# Patient Record
Sex: FEMALE | Race: ASIAN | NOT HISPANIC OR LATINO
[De-identification: names, ages, dates, MRNs, and addresses within clinical notes are randomized per-mention and may not be internally consistent; named-entity substitution may affect disease eponyms.]

---

## 2021-10-29 ENCOUNTER — APPOINTMENT (OUTPATIENT)
Dept: UROLOGY | Facility: CLINIC | Age: 86
End: 2021-10-29

## 2022-06-30 ENCOUNTER — INPATIENT (INPATIENT)
Facility: HOSPITAL | Age: 87
LOS: 2 days | Discharge: HOME HEALTH | DRG: 556 | End: 2022-07-03
Attending: INTERNAL MEDICINE | Admitting: INTERNAL MEDICINE
Payer: MEDICARE

## 2022-06-30 VITALS
TEMPERATURE: 98 F | DIASTOLIC BLOOD PRESSURE: 93 MMHG | OXYGEN SATURATION: 97 % | HEIGHT: 61 IN | SYSTOLIC BLOOD PRESSURE: 152 MMHG | WEIGHT: 121.25 LBS | RESPIRATION RATE: 18 BRPM | HEART RATE: 84 BPM

## 2022-06-30 DIAGNOSIS — M25.551 PAIN IN RIGHT HIP: ICD-10-CM

## 2022-06-30 DIAGNOSIS — Z98.890 OTHER SPECIFIED POSTPROCEDURAL STATES: Chronic | ICD-10-CM

## 2022-06-30 LAB
ALBUMIN SERPL ELPH-MCNC: 4.1 G/DL — SIGNIFICANT CHANGE UP (ref 3.3–5)
ALP SERPL-CCNC: 55 U/L — SIGNIFICANT CHANGE UP (ref 40–120)
ALT FLD-CCNC: 11 U/L — SIGNIFICANT CHANGE UP (ref 10–45)
ANION GAP SERPL CALC-SCNC: 11 MMOL/L — SIGNIFICANT CHANGE UP (ref 5–17)
APPEARANCE UR: CLEAR — SIGNIFICANT CHANGE UP
AST SERPL-CCNC: 24 U/L — SIGNIFICANT CHANGE UP (ref 10–40)
BACTERIA # UR AUTO: ABNORMAL
BASOPHILS # BLD AUTO: 0.02 K/UL — SIGNIFICANT CHANGE UP (ref 0–0.2)
BASOPHILS NFR BLD AUTO: 0.4 % — SIGNIFICANT CHANGE UP (ref 0–2)
BILIRUB SERPL-MCNC: 0.4 MG/DL — SIGNIFICANT CHANGE UP (ref 0.2–1.2)
BILIRUB UR-MCNC: NEGATIVE — SIGNIFICANT CHANGE UP
BUN SERPL-MCNC: 22 MG/DL — SIGNIFICANT CHANGE UP (ref 7–23)
CALCIUM SERPL-MCNC: 10.7 MG/DL — HIGH (ref 8.4–10.5)
CHLORIDE SERPL-SCNC: 107 MMOL/L — SIGNIFICANT CHANGE UP (ref 96–108)
CO2 SERPL-SCNC: 22 MMOL/L — SIGNIFICANT CHANGE UP (ref 22–31)
COLOR SPEC: SIGNIFICANT CHANGE UP
CREAT SERPL-MCNC: 0.96 MG/DL — SIGNIFICANT CHANGE UP (ref 0.5–1.3)
DIFF PNL FLD: NEGATIVE — SIGNIFICANT CHANGE UP
EGFR: 57 ML/MIN/1.73M2 — LOW
EOSINOPHIL # BLD AUTO: 0.02 K/UL — SIGNIFICANT CHANGE UP (ref 0–0.5)
EOSINOPHIL NFR BLD AUTO: 0.4 % — SIGNIFICANT CHANGE UP (ref 0–6)
EPI CELLS # UR: 1 /HPF — SIGNIFICANT CHANGE UP
FLUAV AG NPH QL: SIGNIFICANT CHANGE UP
FLUBV AG NPH QL: SIGNIFICANT CHANGE UP
GLUCOSE SERPL-MCNC: 107 MG/DL — HIGH (ref 70–99)
GLUCOSE UR QL: NEGATIVE — SIGNIFICANT CHANGE UP
HCT VFR BLD CALC: 37.9 % — SIGNIFICANT CHANGE UP (ref 34.5–45)
HGB BLD-MCNC: 12.2 G/DL — SIGNIFICANT CHANGE UP (ref 11.5–15.5)
HYALINE CASTS # UR AUTO: 1 /LPF — SIGNIFICANT CHANGE UP (ref 0–2)
IMM GRANULOCYTES NFR BLD AUTO: 0.4 % — SIGNIFICANT CHANGE UP (ref 0–1.5)
KETONES UR-MCNC: NEGATIVE — SIGNIFICANT CHANGE UP
LEUKOCYTE ESTERASE UR-ACNC: NEGATIVE — SIGNIFICANT CHANGE UP
LYMPHOCYTES # BLD AUTO: 1.53 K/UL — SIGNIFICANT CHANGE UP (ref 1–3.3)
LYMPHOCYTES # BLD AUTO: 28.9 % — SIGNIFICANT CHANGE UP (ref 13–44)
MCHC RBC-ENTMCNC: 30.5 PG — SIGNIFICANT CHANGE UP (ref 27–34)
MCHC RBC-ENTMCNC: 32.2 GM/DL — SIGNIFICANT CHANGE UP (ref 32–36)
MCV RBC AUTO: 94.8 FL — SIGNIFICANT CHANGE UP (ref 80–100)
MONOCYTES # BLD AUTO: 0.42 K/UL — SIGNIFICANT CHANGE UP (ref 0–0.9)
MONOCYTES NFR BLD AUTO: 7.9 % — SIGNIFICANT CHANGE UP (ref 2–14)
NEUTROPHILS # BLD AUTO: 3.28 K/UL — SIGNIFICANT CHANGE UP (ref 1.8–7.4)
NEUTROPHILS NFR BLD AUTO: 62 % — SIGNIFICANT CHANGE UP (ref 43–77)
NITRITE UR-MCNC: NEGATIVE — SIGNIFICANT CHANGE UP
NRBC # BLD: 0 /100 WBCS — SIGNIFICANT CHANGE UP (ref 0–0)
PH UR: 7 — SIGNIFICANT CHANGE UP (ref 5–8)
PLATELET # BLD AUTO: 161 K/UL — SIGNIFICANT CHANGE UP (ref 150–400)
POTASSIUM SERPL-MCNC: 5 MMOL/L — SIGNIFICANT CHANGE UP (ref 3.5–5.3)
POTASSIUM SERPL-SCNC: 5 MMOL/L — SIGNIFICANT CHANGE UP (ref 3.5–5.3)
PROT SERPL-MCNC: 7.5 G/DL — SIGNIFICANT CHANGE UP (ref 6–8.3)
PROT UR-MCNC: NEGATIVE — SIGNIFICANT CHANGE UP
RBC # BLD: 4 M/UL — SIGNIFICANT CHANGE UP (ref 3.8–5.2)
RBC # FLD: 14.5 % — SIGNIFICANT CHANGE UP (ref 10.3–14.5)
RBC CASTS # UR COMP ASSIST: 8 /HPF — HIGH (ref 0–4)
RSV RNA NPH QL NAA+NON-PROBE: SIGNIFICANT CHANGE UP
SARS-COV-2 RNA SPEC QL NAA+PROBE: SIGNIFICANT CHANGE UP
SODIUM SERPL-SCNC: 140 MMOL/L — SIGNIFICANT CHANGE UP (ref 135–145)
SP GR SPEC: 1.01 — SIGNIFICANT CHANGE UP (ref 1.01–1.02)
UROBILINOGEN FLD QL: NEGATIVE — SIGNIFICANT CHANGE UP
WBC # BLD: 5.29 K/UL — SIGNIFICANT CHANGE UP (ref 3.8–10.5)
WBC # FLD AUTO: 5.29 K/UL — SIGNIFICANT CHANGE UP (ref 3.8–10.5)
WBC UR QL: 7 /HPF — HIGH (ref 0–5)

## 2022-06-30 PROCEDURE — 73562 X-RAY EXAM OF KNEE 3: CPT | Mod: 26,RT

## 2022-06-30 PROCEDURE — 71045 X-RAY EXAM CHEST 1 VIEW: CPT | Mod: 26

## 2022-06-30 PROCEDURE — 99285 EMERGENCY DEPT VISIT HI MDM: CPT

## 2022-06-30 PROCEDURE — 93010 ELECTROCARDIOGRAM REPORT: CPT

## 2022-06-30 PROCEDURE — 73502 X-RAY EXAM HIP UNI 2-3 VIEWS: CPT | Mod: 26,RT

## 2022-06-30 PROCEDURE — 73552 X-RAY EXAM OF FEMUR 2/>: CPT | Mod: 26,RT

## 2022-06-30 RX ORDER — ENOXAPARIN SODIUM 100 MG/ML
30 INJECTION SUBCUTANEOUS EVERY 24 HOURS
Refills: 0 | Status: DISCONTINUED | OUTPATIENT
Start: 2022-06-30 | End: 2022-07-03

## 2022-06-30 NOTE — ED PROVIDER NOTE - CLINICAL SUMMARY MEDICAL DECISION MAKING FREE TEXT BOX
Trinity DUGGAN PGY-2: 89 yo F hx dementia, HTN p/w R hip pain with restricted ROM, 3/5 weakness RLE. Low suspicion for cauda equina, recent MRI showing spinal stenosis, no midline ttp, hx IVDA, no urinary/bowel incontinence/retention. No hx trauma however will obtain XR to check for fx. Will admit given decreased ambulatory status

## 2022-06-30 NOTE — PATIENT PROFILE ADULT - FALL HARM RISK - HARM RISK INTERVENTIONS

## 2022-06-30 NOTE — H&P ADULT - ASSESSMENT
89 yo F hx HTN, dementia (ao x1 at baseline per son to self/birthdate), presenting with 6 months R hip pain now with inability to ambulate since today. Son at bedside to provide hx. Patient and son denying any recent/past falls, patient  AOx3 at time of interview. Patient localizes pain to lateral R hip and anterior R thigh. Has hx compression fracture now s/p spinal surgery. Had MRI 5/30 showing T12-L1 spinal stenosis. Currently denying any back pain, no chest pain/sob, no numbness/tingling down RLE, no urinary/bowel retention/incontinence. No nausea/vomiting, fever/chills. Uses walker at baseline, unable to ambulate today 2/2 pain.     - ptn is nontoxic appearing  - will check Urine Cx and UA  - CXR is clear  - PT eval  - DVT ppx w sc Lovenox  - neuro called

## 2022-06-30 NOTE — H&P ADULT - HISTORY OF PRESENT ILLNESS
89 yo F hx HTN, dementia (ao x1 at baseline per son to self/birthdate), presenting with 6 months R hip pain now with inability to ambulate since today. Son at bedside to provide hx. Patient and son denying any recent/past falls, patient  AOx3 at time of interview. Patient localizes pain to lateral R hip and anterior R thigh. Has hx compression fracture now s/p spinal surgery. Had MRI 5/30 showing T12-L1 spinal stenosis. Currently denying any back pain, no chest pain/sob, no numbness/tingling down RLE, no urinary/bowel retention/incontinence. No nausea/vomiting, fever/chills. Uses walker at baseline, unable to ambulate today 2/2 pain.

## 2022-06-30 NOTE — ED ADULT NURSE REASSESSMENT NOTE - NS ED NURSE REASSESS COMMENT FT1
Pt admitted to medicine for right hip pain. VITALY Ferrari at bedside. Pt admitted to medicine for right hip pain. VITALY Ferrari at bedside speaking with pt's son. Report given to PAM Ann in ED Holding.

## 2022-06-30 NOTE — H&P ADULT - NSHPPHYSICALEXAM_GEN_ALL_CORE
T(F): 97.8 (06-30-22 @ 12:21), Max: 98.1 (06-30-22 @ 08:59)  HR: 75 (06-30-22 @ 16:20) (69 - 84)  BP: 154/83 (06-30-22 @ 16:20) (133/64 - 154/83)  RR: 19 (06-30-22 @ 16:20) (18 - 19)  SpO2: 98% (06-30-22 @ 16:20) (97% - 100%)    PHYSICAL EXAM:  GENERAL: NAD, well-developed  HEAD:  Atraumatic, Normocephalic  EYES: EOMI, PERRLA, conjunctiva and sclera clear  NECK: Supple, No JVD  CHEST/LUNG: Clear to auscultation bilaterally; No wheeze  HEART: Regular rate and rhythm; No murmurs, rubs, or gallops  ABDOMEN: Soft, Nontender, Nondistended; Bowel sounds present  EXTREMITIES:  2+ Peripheral Pulses, No clubbing, cyanosis, or edema  PSYCH: AAOx3  NEUROLOGY: non-focal  SKIN: No rashes or lesions

## 2022-06-30 NOTE — ED PROVIDER NOTE - OBJECTIVE STATEMENT
89 yo F hx HTN, dementia (ao x1 at baseline per son to self/birthdate), presenting with 6 months R hip pain now with inability to ambulate since today. Son at bedside to provide hx. Patient and son denying any recent/past falls, patient  AOx3 at time of interview. Patient localizes pain to lateral R hip and anterior R thigh. Has hx compression fracture now s/p spinal surgery. Had MRI 5/30 showing T12-L1 spinal stenosis. Currently denying any back pain, no chest pain/sob, no numbness/tingling down RLE, no urinary/bowel retention/incontinence. No nausea/vomiting, fever/chills. Uses walker at baseline, unable to ambulate today 2/2 pain.     NKDA 87 yo F hx HTN, dementia (ao x1 at baseline per son to self/birthdate), presenting with 6 months R hip pain now with inability to ambulate since today. Son at bedside to provide hx. Patient and son denying any recent/past falls, patient  AOx3 at time of interview. Patient localizes pain to lateral R hip and anterior R thigh. Has hx compression fracture now s/p spinal surgery. Had MRI 5/30 showing T12-L1 spinal stenosis. Currently denying any back pain, no chest pain/sob, no numbness/tingling down RLE, no urinary/bowel retention/incontinence. No nausea/vomiting, fever/chills. Uses walker at baseline, unable to ambulate today 2/2 pain.     NKRORY  PMD: Nicholas DOMINGUEZ

## 2022-06-30 NOTE — ED PROVIDER NOTE - ATTENDING CONTRIBUTION TO CARE
I, Javan Jose, performed a history and physical exam of the patient and discussed their management with the resident and/or advanced care provider. I reviewed the resident and/or advanced care provider's note and agree with the documented findings and plan of care. I was present and available for all procedures.    89 yo F hx HTN, dementia (ao x1 at baseline per son to self. presenting with 6 months R hip pain now with inability to ambulate since today. Son at bedside to provide hx. Patient and son denying any recent/past falls, patient  pain to lateral R hip and anterior R thigh. Has hx compression fracture now s/p spinal surgery. Had MRI 5/30 showing T12-L1 spinal stenosis. Currently denying any back pain, no chest pain/sob, no numbness/tingling down RLE, no urinary/bowel retention/incontinence. No nausea/vomiting, fever/chills. Uses walker at baseline, unable to ambulate today 2/2 pain.     Well appearing and in NAD, head normal appearing atraumatic, trachea midline, no respiratory distress, lungs cta bilaterally, rrr no murmurs, soft NT ND abdomen, no visible extremity deformities, right gt ttp no skin changes, flexion limited 2/2 pain. normal knee ankle foot. no back ttp, no lle ttp, pelvis stable chest wall without ttp, no c/t/l spine ttp Alert and oriented, non focal neuro exam, skin warm and dry, normal affect and mood    well appearing but concerning for worsening rle pain possibly acute on chronic arthritis otherwise will eval for path fracture with xr screening labs analgesia prn admit unlikely vascular or neurologic in nature poss rehab/pt eval

## 2022-06-30 NOTE — ED ADULT NURSE REASSESSMENT NOTE - NS ED NURSE REASSESS COMMENT FT1
Pt's son requesting to speak with NP r/t admission. Attempted to contact NP to see pt multiple times with no answer.

## 2022-06-30 NOTE — ED ADULT TRIAGE NOTE - SPO2 (%)
I will SWITCH the dose or number of times a day I take the medications listed below when I get home from the hospital:    Xarelto 20 mg oral tablet  -- 1 tab(s) by mouth once a day (in the evening)  please hold today, tomorrow and restart on 1/4/18 97

## 2022-06-30 NOTE — ED ADULT NURSE REASSESSMENT NOTE - NS ED NURSE REASSESS COMMENT FT1
when checking on patient pt stated she was hungry and upset there wasn't an earlier dinner offered earlier , pt explained dietary will bring dinner trays shortly, f/s 126,  no s.s of hypoglycemia noted, pt given juice and sandwich at this time, VS remain stable & medicated per MD orders

## 2022-06-30 NOTE — ED ADULT NURSE NOTE - OBJECTIVE STATEMENT
received pt in assigned room a&ox1-2 per baseline of pt, son states pt has Dementia pt c/o rt hip pain x6 months & today could not walk & that's why he called 911, pt has a h/o compression fx & received back surgery a few months ago, & in May received an MRI & " received an injection for pain", denies pt falling recently, good color pulse & sensation to RLE,  skin intact, resps even and on labored, ivl placed labs drawn,  son at bedside, will continue to monitor pt

## 2022-06-30 NOTE — ED ADULT NURSE REASSESSMENT NOTE - NS ED NURSE REASSESS COMMENT FT1
pt remains resting, reports no worsening pain to rt hip, admitted to hospital , son remains at bedside

## 2022-06-30 NOTE — ED ADULT NURSE REASSESSMENT NOTE - NS ED NURSE REASSESS COMMENT FT1
Son requesting for patient to attempt to walk with walker and if able to walk rather take pt home  rather than have her admitted to the hospital, spoke with VITALY Liang  regarding this matter & will come shortly to evaluate pt.

## 2022-06-30 NOTE — ED ADULT NURSE NOTE - NS ED NURSE RECORD ANOTHER VITAL SIGN
Yes
responds to verbal commands/deep reflexes intact/normal strength/responds to pain/no spontaneous movement/alert and oriented x 3/sensation intact/cranial nerves intact/superficial reflexes intact

## 2022-07-01 RX ORDER — OXYBUTYNIN CHLORIDE 5 MG
5 TABLET ORAL DAILY
Refills: 0 | Status: DISCONTINUED | OUTPATIENT
Start: 2022-07-01 | End: 2022-07-03

## 2022-07-01 RX ORDER — ASPIRIN/CALCIUM CARB/MAGNESIUM 324 MG
81 TABLET ORAL DAILY
Refills: 0 | Status: DISCONTINUED | OUTPATIENT
Start: 2022-07-01 | End: 2022-07-03

## 2022-07-01 RX ORDER — LOSARTAN POTASSIUM 100 MG/1
100 TABLET, FILM COATED ORAL DAILY
Refills: 0 | Status: DISCONTINUED | OUTPATIENT
Start: 2022-07-01 | End: 2022-07-03

## 2022-07-01 RX ORDER — AMLODIPINE BESYLATE 2.5 MG/1
5 TABLET ORAL DAILY
Refills: 0 | Status: DISCONTINUED | OUTPATIENT
Start: 2022-07-01 | End: 2022-07-03

## 2022-07-01 RX ORDER — ATORVASTATIN CALCIUM 80 MG/1
40 TABLET, FILM COATED ORAL AT BEDTIME
Refills: 0 | Status: DISCONTINUED | OUTPATIENT
Start: 2022-07-01 | End: 2022-07-03

## 2022-07-01 RX ORDER — CEFTRIAXONE 500 MG/1
1000 INJECTION, POWDER, FOR SOLUTION INTRAMUSCULAR; INTRAVENOUS EVERY 24 HOURS
Refills: 0 | Status: DISCONTINUED | OUTPATIENT
Start: 2022-07-02 | End: 2022-07-03

## 2022-07-01 RX ORDER — TRAMADOL HYDROCHLORIDE 50 MG/1
50 TABLET ORAL EVERY 6 HOURS
Refills: 0 | Status: DISCONTINUED | OUTPATIENT
Start: 2022-07-01 | End: 2022-07-03

## 2022-07-01 RX ORDER — CEFTRIAXONE 500 MG/1
INJECTION, POWDER, FOR SOLUTION INTRAMUSCULAR; INTRAVENOUS
Refills: 0 | Status: DISCONTINUED | OUTPATIENT
Start: 2022-07-01 | End: 2022-07-03

## 2022-07-01 RX ORDER — CEFTRIAXONE 500 MG/1
1000 INJECTION, POWDER, FOR SOLUTION INTRAMUSCULAR; INTRAVENOUS ONCE
Refills: 0 | Status: COMPLETED | OUTPATIENT
Start: 2022-07-01 | End: 2022-07-01

## 2022-07-01 RX ORDER — CELECOXIB 200 MG/1
200 CAPSULE ORAL DAILY
Refills: 0 | Status: DISCONTINUED | OUTPATIENT
Start: 2022-07-01 | End: 2022-07-03

## 2022-07-01 RX ORDER — ACETAMINOPHEN 500 MG
650 TABLET ORAL EVERY 6 HOURS
Refills: 0 | Status: DISCONTINUED | OUTPATIENT
Start: 2022-07-01 | End: 2022-07-03

## 2022-07-01 RX ORDER — PROPRANOLOL HCL 160 MG
60 CAPSULE, EXTENDED RELEASE 24HR ORAL DAILY
Refills: 0 | Status: DISCONTINUED | OUTPATIENT
Start: 2022-07-01 | End: 2022-07-03

## 2022-07-01 RX ORDER — MEMANTINE HYDROCHLORIDE 10 MG/1
5 TABLET ORAL
Refills: 0 | Status: DISCONTINUED | OUTPATIENT
Start: 2022-07-01 | End: 2022-07-03

## 2022-07-01 RX ADMIN — ENOXAPARIN SODIUM 30 MILLIGRAM(S): 100 INJECTION SUBCUTANEOUS at 16:17

## 2022-07-01 RX ADMIN — Medication 650 MILLIGRAM(S): at 23:44

## 2022-07-01 RX ADMIN — MEMANTINE HYDROCHLORIDE 5 MILLIGRAM(S): 10 TABLET ORAL at 23:01

## 2022-07-01 RX ADMIN — CEFTRIAXONE 100 MILLIGRAM(S): 500 INJECTION, POWDER, FOR SOLUTION INTRAMUSCULAR; INTRAVENOUS at 22:15

## 2022-07-01 RX ADMIN — ATORVASTATIN CALCIUM 40 MILLIGRAM(S): 80 TABLET, FILM COATED ORAL at 22:15

## 2022-07-01 RX ADMIN — Medication 650 MILLIGRAM(S): at 23:01

## 2022-07-01 NOTE — PROGRESS NOTE ADULT - SUBJECTIVE AND OBJECTIVE BOX
Patient is a 88y old  Female who presents with a chief complaint of functional paraplegia (2022 11:01)      SUBJECTIVE / OVERNIGHT EVENTS: no new c/o, awaiting PT eval    MEDICATIONS  (STANDING):  enoxaparin Injectable 30 milliGRAM(s) SubCutaneous every 24 hours    MEDICATIONS  (PRN):  acetaminophen     Tablet .. 650 milliGRAM(s) Oral every 6 hours PRN Temp greater or equal to 38C (100.4F), Mild Pain (1 - 3)      Vital Signs Last 24 Hrs  T(F): 98.4 (22 @ 16:51), Max: 98.4 (22 @ 16:51)  HR: 77 (22 @ 16:51) (70 - 77)  BP: 136/71 (22 @ 16:51) (124/83 - 168/90)  RR: 20 (22 @ 16:51) (19 - 20)  SpO2: 98% (22 @ 16:51) (97% - 100%)  Telemetry:   CAPILLARY BLOOD GLUCOSE        I&O's Summary      PHYSICAL EXAM:  GENERAL: NAD, well-developed  HEAD:  Atraumatic, Normocephalic  EYES: EOMI, PERRLA, conjunctiva and sclera clear  NECK: Supple, No JVD  CHEST/LUNG: Clear to auscultation bilaterally; No wheeze  HEART: Regular rate and rhythm; No murmurs, rubs, or gallops  ABDOMEN: Soft, Nontender, Nondistended; Bowel sounds present  EXTREMITIES:  2+ Peripheral Pulses, No clubbing, cyanosis, or edema  PSYCH: AAOx3  NEUROLOGY: non-focal  SKIN: No rashes or lesions    LABS:                        12.2   5.29  )-----------( 161      ( 2022 10:27 )             37.9     06-30    140  |  107  |  22  ----------------------------<  107<H>  5.0   |  22  |  0.96    Ca    10.7<H>      2022 10:27    TPro  7.5  /  Alb  4.1  /  TBili  0.4  /  DBili  x   /  AST  24  /  ALT  11  /  AlkPhos  55  06-30      CARDIAC MARKERS ( 2022 10:27 )  x     / x     / 51 U/L / x     / x          Urinalysis Basic - ( 2022 13:13 )    Color: Light Yellow / Appearance: Clear / S.013 / pH: x  Gluc: x / Ketone: Negative  / Bili: Negative / Urobili: Negative   Blood: x / Protein: Negative / Nitrite: Negative   Leuk Esterase: Negative / RBC: 8 /hpf / WBC 7 /HPF   Sq Epi: x / Non Sq Epi: 1 /hpf / Bacteria: Many        RADIOLOGY & ADDITIONAL TESTS:    Imaging Personally Reviewed:    Consultant(s) Notes Reviewed:      Care Discussed with Consultants/Other Providers:   Patient is a 88y old  Female who presents with a chief complaint of functional paraplegia (2022 11:01)      SUBJECTIVE / OVERNIGHT EVENTS: no new c/o,  PT phi singer, dispo is home w home PT. ADRIANNEx growing GNR    MEDICATIONS  (STANDING):  enoxaparin Injectable 30 milliGRAM(s) SubCutaneous every 24 hours    MEDICATIONS  (PRN):  acetaminophen     Tablet .. 650 milliGRAM(s) Oral every 6 hours PRN Temp greater or equal to 38C (100.4F), Mild Pain (1 - 3)      Vital Signs Last 24 Hrs  T(F): 98.4 (22 @ 16:51), Max: 98.4 (22 @ 16:51)  HR: 77 (22 @ 16:51) (70 - 77)  BP: 136/71 (22 @ 16:51) (124/83 - 168/90)  RR: 20 (22 @ 16:51) (19 - 20)  SpO2: 98% (22 @ 16:51) (97% - 100%)  Telemetry:   CAPILLARY BLOOD GLUCOSE        I&O's Summary      PHYSICAL EXAM:  GENERAL: NAD, well-developed  HEAD:  Atraumatic, Normocephalic  EYES: EOMI, PERRLA, conjunctiva and sclera clear  NECK: Supple, No JVD  CHEST/LUNG: Clear to auscultation bilaterally; No wheeze  HEART: Regular rate and rhythm; No murmurs, rubs, or gallops  ABDOMEN: Soft, Nontender, Nondistended; Bowel sounds present  EXTREMITIES:  2+ Peripheral Pulses, No clubbing, cyanosis, or edema  PSYCH: AAOx3  NEUROLOGY: non-focal  SKIN: No rashes or lesions    LABS:                        12.2   5.29  )-----------( 161      ( 2022 10:27 )             37.9     06-30    140  |  107  |  22  ----------------------------<  107<H>  5.0   |  22  |  0.96    Ca    10.7<H>      2022 10:27    TPro  7.5  /  Alb  4.1  /  TBili  0.4  /  DBili  x   /  AST  24  /  ALT  11  /  AlkPhos  55  06-30      CARDIAC MARKERS ( 2022 10:27 )  x     / x     / 51 U/L / x     / x          Urinalysis Basic - ( 2022 13:13 )    Color: Light Yellow / Appearance: Clear / S.013 / pH: x  Gluc: x / Ketone: Negative  / Bili: Negative / Urobili: Negative   Blood: x / Protein: Negative / Nitrite: Negative   Leuk Esterase: Negative / RBC: 8 /hpf / WBC 7 /HPF   Sq Epi: x / Non Sq Epi: 1 /hpf / Bacteria: Many        RADIOLOGY & ADDITIONAL TESTS:    Imaging Personally Reviewed:    Consultant(s) Notes Reviewed:      Care Discussed with Consultants/Other Providers:

## 2022-07-01 NOTE — CONSULT NOTE ADULT - SUBJECTIVE AND OBJECTIVE BOX
[facility]        Patient is a 88y old  Female who presents with a chief complaint of functional paraplegia (2022 21:14)    Excerpt from H&P,"  HPI:  87 yo F hx HTN, dementia (ao x1 at baseline per son to self/birthdate), presenting with 6 months R hip pain now with inability to ambulate since today. Son at bedside to provide hx. Patient and son denying any recent/past falls, patient  AOx3 at time of interview. Patient localizes pain to lateral R hip and anterior R thigh. Has hx compression fracture now s/p spinal surgery. Had MRI  showing T12-L1 spinal stenosis. Currently denying any back pain, no chest pain/sob, no numbness/tingling down RLE, no urinary/bowel retention/incontinence. No nausea/vomiting, fever/chills. Uses walker at baseline, unable to ambulate today 2/2 pain.  (2022 21:14)           *****PAST MEDICAL / Surgical  HISTORY:  PAST MEDICAL & SURGICAL HISTORY:  Hypertension      Dementia      S/P spinal surgery               *****FAMILY HISTORY:  FAMILY HISTORY:           *****SOCIAL HISTORY:  Alcohol: None  Smoking: None         *****ALLERGIES:   Allergies    No Known Allergies    Intolerances             *****MEDICATIONS: current medication reviewed and documented.   MEDICATIONS  (STANDING):  enoxaparin Injectable 30 milliGRAM(s) SubCutaneous every 24 hours    MEDICATIONS  (PRN):           *****REVIEW OF SYSTEM:  GEN: no fever, no chills, no pain  RESP: no SOB, no cough, no sputum  CVS: no chest pain, no palpitations, no edema  GI: no abdominal pain, no nausea, no vomiting, no constipation, no diarrhea  : no dysurea, no frequency, no hematurea  Neuro: no headache, no dizziness  PSYCH: no anxiety, no depression  Derm : no itching, no rash         *****VITAL SIGNS:  T(C): 36.3 (22 @ 04:57), Max: 36.8 (22 @ 22:08)  HR: 73 (22 @ 04:57) (73 - 75)  BP: 127/83 (22 @ 04:57) (127/83 - 168/90)  RR: 19 (22 @ 04:57) (18 - 19)  SpO2: 97% (22 @ 04:57) (97% - 100%)  Wt(kg): --           *****PHYSICAL EXAM:   Alert oriented x 3   Attention comprehension are fair. Able to name, repeat, read without any difficulty.   Able to follow 3 step commands.     EOMI fundi not visualized,  VFF to confrontration  No facial asymmetry   Tongue is midline   Palate elevates symmetrically   Moving all 4 ext symmetrically no pronator drift   Reflexes are symmetric throughout   sensation is grossly symmetric  Gait : not assessed.  B/L down going toes               *****LAB AND IMAGIN.2   5.29  )-----------( 161      ( 2022 10:27 )             37.9               06-30    140  |  107  |  22  ----------------------------<  107<H>  5.0   |  22  |  0.96    Ca    10.7<H>      2022 10:27    TPro  7.5  /  Alb  4.1  /  TBili  0.4  /  DBili  x   /  AST  24  /  ALT  11  /  AlkPhos  55  06-30                CARDIAC MARKERS ( 2022 10:27 )  x     / x     / 51 U/L / x     / x                  Urinalysis Basic - ( 2022 13:13 )    Color: Light Yellow / Appearance: Clear / S.013 / pH: x  Gluc: x / Ketone: Negative  / Bili: Negative / Urobili: Negative   Blood: x / Protein: Negative / Nitrite: Negative   Leuk Esterase: Negative / RBC: 8 /hpf / WBC 7 /HPF   Sq Epi: x / Non Sq Epi: 1 /hpf / Bacteria: Many        [All pertinent recent Imaging reports reviewed]         *****A S S E S S M E N T   A N D   P L A N :        Problem/Recommendations 1:        Problem/Recommendations 2:         pt at risk for developing delirium, therefore please institute the following preventative measures if possible          - initiating early mobilization          -minimizing "tethers" - IV, oxygen, catheters, etc          -avoiding   sedatives          -maintaining hydration/nutrition          -avoid anticholinergics - diphenhydramine, etc          -pain control          -sleep wake cycle regulation; avoid day time somnolence           -supportive environment    ___________________________  Will follow with you.  Thank you,  Ashley Harper MD  Diplomate of the American Board of Neurology and Psychiatry.  Diplomate of the American Board of Vascular Neurology.   [facility] ([facility]), [facility]   Ph: [phone]    Differential diagnosis and plan of care discussed with patient after the evaluation.   Advanced care planning options discussed.   Pain assessed and judicious use of narcotics when appropriate was discussed.  Importance of Fall prevention discussed.  Counseling on Smoking and Alcohol cessation was offered when appropriate.  Counseling on Diet, exercise, and medication compliance was done.   83 minutes spent on the total encounter;  more than 50 % of the visit was spent on counseling  and or coordinating care by the attending physician.    Thank you for allowing me to participate in the care of this hugo patient. Please do not hesitate to call me if you have any questions.     This and subsequent notes  will  inherently be subject to errors including those of syntax and substitutions which may escape proofreading. In such instances original meaning may be extrapolated by contextual derivation.    [facility]([facility]        Patient is a 88y old  Female who presents with a chief complaint of functional paraplegia (2022 21:14)    Excerpt from H&P,":  89 yo F hx HTN, dementia (ao x1 at baseline per son to self/birthdate), presenting with 6 months R hip pain now with inability to ambulate since today. Son at bedside to provide hx. Patient and son denying any recent/past falls, patient  AOx3 at time of interview. Patient localizes pain to lateral R hip and anterior R thigh. Has hx compression fracture now s/p spinal surgery. Had MRI  showing T12-L1 spinal stenosis. Currently denying any back pain, no chest pain/sob, no numbness/tingling down RLE, no urinary/bowel retention/incontinence. No nausea/vomiting, fever/chills. Uses walker at baseline, unable to ambulate today 2/2 pain.  (2022 21:14)           *****PAST MEDICAL / Surgical  HISTORY:  PAST MEDICAL & SURGICAL HISTORY:  Hypertension      Dementia      S/P spinal surgery               *****FAMILY HISTORY:  FAMILY HISTORY:           *****SOCIAL HISTORY:  Alcohol: None  Smoking: None         *****ALLERGIES:   Allergies    No Known Allergies    Intolerances             *****MEDICATIONS: current medication reviewed and documented.   MEDICATIONS  (STANDING):  enoxaparin Injectable 30 milliGRAM(s) SubCutaneous every 24 hours    MEDICATIONS  (PRN):           *****REVIEW OF SYSTEM:  GEN: no fever, no chills, no pain  RESP: no SOB, no cough, no sputum  CVS: no chest pain, no palpitations, no edema  GI: no abdominal pain, no nausea, no vomiting, no constipation, no diarrhea  : no dysurea, no frequency, no hematurea  Neuro: no headache, no dizziness  PSYCH: no anxiety, no depression  Derm : no itching, no rash         *****VITAL SIGNS:  T(C): 36.3 (22 @ 04:57), Max: 36.8 (22 @ 22:08)  HR: 73 (22 @ 04:57) (73 - 75)  BP: 127/83 (22 @ 04:57) (127/83 - 168/90)  RR: 19 (22 @ 04:57) (18 - 19)  SpO2: 97% (22 @ 04:57) (97% - 100%)  Wt(kg): --           *****PHYSICAL EXAM:   Alert oriented x 1   Attention comprehension are limited  Able to name, repeat,  without any difficulty.   Able to follow 1  step commands.     EOMI fundi not visualized,  blinks to threat   No facial asymmetry   Tongue is midline   Palate elevates symmetrically   Moving all 4 ext symmetrically no pronator drift   Reflexes are symmetric throughout   sensation is grossly symmetric  Gait : not assessed.  B/L down going toes               *****LAB AND IMAGIN.2   5.29  )-----------( 161      ( 2022 10:27 )             37.9               06-30    140  |  107  |  22  ----------------------------<  107<H>  5.0   |  22  |  0.96    Ca    10.7<H>      2022 10:27    TPro  7.5  /  Alb  4.1  /  TBili  0.4  /  DBili  x   /  AST  24  /  ALT  11  /  AlkPhos  55  06-30                CARDIAC MARKERS ( 2022 10:27 )  x     / x     / 51 U/L / x     / x                  Urinalysis Basic - ( 2022 13:13 )    Color: Light Yellow / Appearance: Clear / S.013 / pH: x  Gluc: x / Ketone: Negative  / Bili: Negative / Urobili: Negative   Blood: x / Protein: Negative / Nitrite: Negative   Leuk Esterase: Negative / RBC: 8 /hpf / WBC 7 /HPF   Sq Epi: x / Non Sq Epi: 1 /hpf / Bacteria: Many        [All pertinent recent Imaging reports reviewed]         *****A S S E S S M E N T   A N D   P L A N :  Excerpt from H&P,":  89 yo F hx HTN, dementia (ao x1 at baseline per son to self/birthdate), presenting with 6 months R hip pain now with inability to ambulate since today. Son at bedside to provide hx. Patient and son denying any recent/past falls, patient  AOx3 at time of interview. Patient localizes pain to lateral R hip and anterior R thigh. Has hx compression fracture now s/p spinal surgery. Had MRI  showing T12-L1 spinal stenosis. Currently denying any back pain, no chest pain/sob, no numbness/tingling down RLE, no urinary/bowel retention/incontinence. No nausea/vomiting, fever/chills. Uses walker at baseline, unable to ambulate today 2/2 pain.         Problem/Recommendations 1: r hip pain  mri with t12/l1 stenosis   exam c/w l 1 radiculopathy   r hip xrays unremarkable   gabapentin 100 bid (hold for sedation)   lidocaine patch   pt eval   oob to chair as tolerated   denies any incontinence or saddle anesthesia   will continue to monitor         Problem/Recommendations 2:    ams   waxing and waning mental status due to underlying dementia        pt at risk for developing delirium, therefore please institute the following preventative measures if possible          - initiating early mobilization          -minimizing "tethers" - IV, oxygen, catheters, etc          -avoiding   sedatives          -maintaining hydration/nutrition          -avoid anticholinergics - diphenhydramine, etc          -pain control          -sleep wake cycle regulation; avoid day time somnolence           -supportive environment    ___________________________  Will follow with you.  Thank you,  Ashley Harper MD  Diplomate of the American Board of Neurology and Psychiatry.  Diplomate of the American Board of Vascular Neurology.   [facility]   Ph: [phone]    Differential diagnosis and plan of care discussed with patient after the evaluation.   Advanced care planning options discussed.   Pain assessed and judicious use of narcotics when appropriate was discussed.  Importance of Fall prevention discussed.  Counseling on Smoking and Alcohol cessation was offered when appropriate.  Counseling on Diet, exercise, and medication compliance was done.   83 minutes spent on the total encounter;  more than 50 % of the visit was spent on counseling  and or coordinating care by the attending physician.    Thank you for allowing me to participate in the care of this hugo patient. Please do not hesitate to call me if you have any questions.     This and subsequent notes  will  inherently be subject to errors including those of syntax and substitutions which may escape proofreading. In such instances original meaning may be extrapolated by contextual derivation.

## 2022-07-01 NOTE — PHYSICAL THERAPY INITIAL EVALUATION ADULT - PERTINENT HX OF CURRENT PROBLEM, REHAB EVAL
87 y/o F w/ the below PMH presenting with 6 months R hip pain now with inability to ambulate x1 day. Pt has hx of compression fracture now s/p spinal surgery, all imaging (-) for acute pathology.

## 2022-07-01 NOTE — PHYSICAL THERAPY INITIAL EVALUATION ADULT - ADDITIONAL COMMENTS
Pt lives w/ her son and his family in an apt has no steps at entry, was independent w/ all ADLs and functional mobility at baseline, ambulated w/ a RW.

## 2022-07-02 RX ORDER — LIDOCAINE 4 G/100G
1 CREAM TOPICAL DAILY
Refills: 0 | Status: DISCONTINUED | OUTPATIENT
Start: 2022-07-02 | End: 2022-07-03

## 2022-07-02 RX ORDER — GABAPENTIN 400 MG/1
100 CAPSULE ORAL
Refills: 0 | Status: DISCONTINUED | OUTPATIENT
Start: 2022-07-02 | End: 2022-07-03

## 2022-07-02 RX ADMIN — Medication 1 TABLET(S): at 18:04

## 2022-07-02 RX ADMIN — CEFTRIAXONE 100 MILLIGRAM(S): 500 INJECTION, POWDER, FOR SOLUTION INTRAMUSCULAR; INTRAVENOUS at 21:13

## 2022-07-02 RX ADMIN — AMLODIPINE BESYLATE 5 MILLIGRAM(S): 2.5 TABLET ORAL at 05:40

## 2022-07-02 RX ADMIN — ENOXAPARIN SODIUM 30 MILLIGRAM(S): 100 INJECTION SUBCUTANEOUS at 15:55

## 2022-07-02 RX ADMIN — Medication 1 TABLET(S): at 05:40

## 2022-07-02 RX ADMIN — Medication 650 MILLIGRAM(S): at 21:38

## 2022-07-02 RX ADMIN — Medication 60 MILLIGRAM(S): at 05:41

## 2022-07-02 RX ADMIN — ATORVASTATIN CALCIUM 40 MILLIGRAM(S): 80 TABLET, FILM COATED ORAL at 21:08

## 2022-07-02 RX ADMIN — Medication 650 MILLIGRAM(S): at 21:08

## 2022-07-02 RX ADMIN — MEMANTINE HYDROCHLORIDE 5 MILLIGRAM(S): 10 TABLET ORAL at 12:02

## 2022-07-02 RX ADMIN — MEMANTINE HYDROCHLORIDE 5 MILLIGRAM(S): 10 TABLET ORAL at 21:11

## 2022-07-02 RX ADMIN — LIDOCAINE 1 PATCH: 4 CREAM TOPICAL at 20:44

## 2022-07-02 RX ADMIN — CELECOXIB 200 MILLIGRAM(S): 200 CAPSULE ORAL at 12:33

## 2022-07-02 RX ADMIN — LOSARTAN POTASSIUM 100 MILLIGRAM(S): 100 TABLET, FILM COATED ORAL at 05:40

## 2022-07-02 RX ADMIN — LIDOCAINE 1 PATCH: 4 CREAM TOPICAL at 12:02

## 2022-07-02 RX ADMIN — CELECOXIB 200 MILLIGRAM(S): 200 CAPSULE ORAL at 12:03

## 2022-07-02 RX ADMIN — Medication 81 MILLIGRAM(S): at 12:04

## 2022-07-02 RX ADMIN — GABAPENTIN 100 MILLIGRAM(S): 400 CAPSULE ORAL at 18:04

## 2022-07-02 RX ADMIN — Medication 5 MILLIGRAM(S): at 12:03

## 2022-07-02 NOTE — PROGRESS NOTE ADULT - SUBJECTIVE AND OBJECTIVE BOX
Patient is a 88y old  Female who presents with a chief complaint of functional paraplegia (01 Jul 2022 21:02)      SUBJECTIVE / OVERNIGHT EVENTS:    MEDICATIONS  (STANDING):  amLODIPine   Tablet 5 milliGRAM(s) Oral daily  aspirin enteric coated 81 milliGRAM(s) Oral daily  atorvastatin 40 milliGRAM(s) Oral at bedtime  calcium carbonate 1250 mG  + Vitamin D (OsCal 500 + D) 1 Tablet(s) Oral two times a day  cefTRIAXone   IVPB      cefTRIAXone   IVPB 1000 milliGRAM(s) IV Intermittent every 24 hours  celecoxib 200 milliGRAM(s) Oral daily  enoxaparin Injectable 30 milliGRAM(s) SubCutaneous every 24 hours  gabapentin 100 milliGRAM(s) Oral two times a day  lidocaine   4% Patch 1 Patch Transdermal daily  losartan 100 milliGRAM(s) Oral daily  memantine 5 milliGRAM(s) Oral two times a day  oxybutynin 5 milliGRAM(s) Oral daily  propranolol LA 60 milliGRAM(s) Oral daily    MEDICATIONS  (PRN):  acetaminophen     Tablet .. 650 milliGRAM(s) Oral every 6 hours PRN Temp greater or equal to 38C (100.4F), Mild Pain (1 - 3)  traMADol 50 milliGRAM(s) Oral every 6 hours PRN Mild Pain (1 - 3)      Vital Signs Last 24 Hrs  T(F): 98.1 (07-02-22 @ 10:28), Max: 98.4 (07-01-22 @ 16:51)  HR: 54 (07-02-22 @ 10:28) (54 - 77)  BP: 139/87 (07-02-22 @ 10:28) (132/82 - 156/90)  RR: 18 (07-02-22 @ 10:28) (18 - 20)  SpO2: 99% (07-02-22 @ 10:28) (98% - 99%)  Telemetry:   CAPILLARY BLOOD GLUCOSE        I&O's Summary    01 Jul 2022 07:01  -  02 Jul 2022 07:00  --------------------------------------------------------  IN: 200 mL / OUT: 0 mL / NET: 200 mL        PHYSICAL EXAM:  GENERAL: NAD, well-developed  HEAD:  Atraumatic, Normocephalic  EYES: EOMI, PERRLA, conjunctiva and sclera clear  NECK: Supple, No JVD  CHEST/LUNG: Clear to auscultation bilaterally; No wheeze  HEART: Regular rate and rhythm; No murmurs, rubs, or gallops  ABDOMEN: Soft, Nontender, Nondistended; Bowel sounds present  EXTREMITIES:  2+ Peripheral Pulses, No clubbing, cyanosis, or edema  PSYCH: AAOx3  NEUROLOGY: non-focal  SKIN: No rashes or lesions

## 2022-07-02 NOTE — CONSULT NOTE ADULT - SUBJECTIVE AND OBJECTIVE BOX
[facility], Division of Infectious Diseases  ANNIE Gil S. Shah, Y. Patel, G. Dex  [phone]  ([phone] - weekdays after 5pm and weekends)    JANI TOTH  88y, Female  [id number]    HPI--  HPI:  89 yo F hx HTN, dementia who presented with 6 months of R hip pain which has progressed to limiting her ability to ambulate. Son at bedside and patient provided history. Patient and son denying any recent/past falls. Patient localizes pain to lateral R hip. Has hx compression fracture now s/p spinal surgery. Had MRI 5/30 showing T12-L1 spinal stenosis. Denies fever, chills, chest pain, SOB, productive cough, abd pain, dysuria, flank pain, n/v, diarrhea, constipation. Endorses chronic frequency x 2 years, unchanged  language line solution  ID [id number]    ROS: 10 point review of systems completed, pertinent positives and negatives as per HPI.    Allergies: No Known Allergies    PMH -- Hypertension    Dementia      PSH -- S/P spinal surgery      FH --   Social History -- denies tobacco, alcohol or illicit drug use  Travel/Environmental/Occupational History:     Physical Exam--  Vital Signs Last 24 Hrs  T(F): 98.1 (02 Jul 2022 10:28), Max: 98.4 (01 Jul 2022 16:51)  HR: 54 (02 Jul 2022 10:28) (54 - 77)  BP: 139/87 (02 Jul 2022 10:28) (132/82 - 156/90)  RR: 18 (02 Jul 2022 10:28) (18 - 20)  SpO2: 99% (02 Jul 2022 10:28) (98% - 99%)  General: nontoxic-appearing, no acute distress  HEENT: NC/AT, anicteric  Neck: Not rigid. No LAD  Lungs: Clear bilaterally without rales, wheezing or rhonchi  Heart: S1, S2, normal rate. No murmur  Abdomen: Soft. Nondistended. Nontender.  Back: No costovertebral angle tenderness.  Extremities: no decreased ROM of R hip, No overlying erythema, No LE edema.   Skin: Warm. Dry. No rash.   Psychiatric: Appropriate affect and mood for situation.   Lines: PIV w/o surrounding erythema or tenderness    Laboratory & Imaging Data--  CBC:   WBC Count: 5.29 K/uL (06-30-22 @ 10:27)    CMP:           Microbiology:     Culture - Urine (collected 06-30-22 @ 13:13)  Source: Clean Catch Clean Catch (Midstream)  Preliminary Report (07-01-22 @ 23:19):    >100,000 CFU/ml Klebsiella pneumoniae      Radiology--  ***  Active Medications--  acetaminophen     Tablet .. 650 milliGRAM(s) Oral every 6 hours PRN  amLODIPine   Tablet 5 milliGRAM(s) Oral daily  aspirin enteric coated 81 milliGRAM(s) Oral daily  atorvastatin 40 milliGRAM(s) Oral at bedtime  calcium carbonate 1250 mG  + Vitamin D (OsCal 500 + D) 1 Tablet(s) Oral two times a day  cefTRIAXone   IVPB      cefTRIAXone   IVPB 1000 milliGRAM(s) IV Intermittent every 24 hours  celecoxib 200 milliGRAM(s) Oral daily  enoxaparin Injectable 30 milliGRAM(s) SubCutaneous every 24 hours  gabapentin 100 milliGRAM(s) Oral two times a day  lidocaine   4% Patch 1 Patch Transdermal daily  losartan 100 milliGRAM(s) Oral daily  memantine 5 milliGRAM(s) Oral two times a day  oxybutynin 5 milliGRAM(s) Oral daily  propranolol LA 60 milliGRAM(s) Oral daily  traMADol 50 milliGRAM(s) Oral every 6 hours PRN    Antimicrobials:   cefTRIAXone   IVPB      cefTRIAXone   IVPB 1000 milliGRAM(s) IV Intermittent every 24 hours    Immunologic:

## 2022-07-02 NOTE — CONSULT NOTE ADULT - ASSESSMENT
87 y/o F hx HTN, dementia who presented presenting with 6 months R hip pain now with inability to ambulate since today.     asymptomatic bacteriuria  urine culture w/ klebsiella pneemoniae  pt endorses chronic frequency x 2 years, unchanged  denies dysuria, urgency  recommend discontinuing antibiotics and monitor off    R hip pain  no overlying area of cellulitis  ROM not decreased on exam  no leukocytosis, afebrile  s/p multiple x-rays- No fractures or dislocations Preserved bilateral hip joint spaces. Right knee tricompartment osteoarthritis with small knee joint effusion. No joint margin erosions. Generalized osteopenia otherwise no discrete suspicious lytic or blastic   lesions.  pain management per primary team    Eliu Kowalski M.D.  [facility], Division of Infectious Diseases  [phone]  After 5pm on weekdays and all day on weekends - please call [phone]

## 2022-07-02 NOTE — PROGRESS NOTE ADULT - SUBJECTIVE AND OBJECTIVE BOX
[facility]      Seen earlier today, and examined.  - Today, patient is without complaints.           *****MEDICATIONS: Current medication reviewed and documented.    MEDICATIONS  (STANDING):  amLODIPine   Tablet 5 milliGRAM(s) Oral daily  aspirin enteric coated 81 milliGRAM(s) Oral daily  atorvastatin 40 milliGRAM(s) Oral at bedtime  calcium carbonate 1250 mG  + Vitamin D (OsCal 500 + D) 1 Tablet(s) Oral two times a day  cefTRIAXone   IVPB      cefTRIAXone   IVPB 1000 milliGRAM(s) IV Intermittent every 24 hours  celecoxib 200 milliGRAM(s) Oral daily  enoxaparin Injectable 30 milliGRAM(s) SubCutaneous every 24 hours  gabapentin 100 milliGRAM(s) Oral two times a day  lidocaine   4% Patch 1 Patch Transdermal daily  losartan 100 milliGRAM(s) Oral daily  memantine 5 milliGRAM(s) Oral two times a day  oxybutynin 5 milliGRAM(s) Oral daily  propranolol LA 60 milliGRAM(s) Oral daily    MEDICATIONS  (PRN):  acetaminophen     Tablet .. 650 milliGRAM(s) Oral every 6 hours PRN Temp greater or equal to 38C (100.4F), Mild Pain (1 - 3)  traMADol 50 milliGRAM(s) Oral every 6 hours PRN Mild Pain (1 - 3)          ***** VITAL SIGNS:  T(F): 98.1 (07-02-22 @ 10:28), Max: 98.4 (07-01-22 @ 16:51)  HR: 54 (07-02-22 @ 10:28) (54 - 77)  BP: 139/87 (07-02-22 @ 10:28) (132/82 - 156/90)  RR: 18 (07-02-22 @ 10:28) (18 - 20)  SpO2: 99% (07-02-22 @ 10:28) (98% - 99%)  Wt(kg): --  ,   I&O's Summary    01 Jul 2022 07:01  -  02 Jul 2022 07:00  --------------------------------------------------------  IN: 200 mL / OUT: 0 mL / NET: 200 mL             *****PHYSICAL EXAM: limited due to language    alert   can pantomime simple commands     EOmi fundi not visualized blinks to threat   Tongue is midline  Palate elevates symmetrically   Moving all 4 ext spontaneously no drift appreciated    Gait not assessed.            *****LAB AND IMAGING:                                         [All pertinent recent Imaging/Reports reviewed]           *****A S S E S S M E N T   A N D   P L A N :  Excerpt from H&P,":  87 yo F hx HTN, dementia (ao x1 at baseline per son to self/birthdate), presenting with 6 months R hip pain now with inability to ambulate since today. Son at bedside to provide hx. Patient and son denying any recent/past falls, patient  AOx3 at time of interview. Patient localizes pain to lateral R hip and anterior R thigh. Has hx compression fracture now s/p spinal surgery. Had MRI 5/30 showing T12-L1 spinal stenosis. Currently denying any back pain, no chest pain/sob, no numbness/tingling down RLE, no urinary/bowel retention/incontinence. No nausea/vomiting, fever/chills. Uses walker at baseline, unable to ambulate today 2/2 pain.         Problem/Recommendations 1: r hip pain 5/30 mri with t12/l1 stenosis   exam c/w l 1 radiculopathy   r hip xrays unremarkable   gabapentin 100 bid (hold for sedation)   lidocaine patch   pt eval   oob to chair as tolerated   denies any incontinence or saddle anesthesia   will continue to monitor         Problem/Recommendations 2:    ams   waxing and waning mental status due to underlying dementia        pt at risk for developing delirium, therefore please institute the following preventative measures if possible          - initiating early mobilization          -minimizing "tethers" - IV, oxygen, catheters, etc          -avoiding   sedatives          -maintaining hydration/nutrition          -avoid anticholinergics - diphenhydramine, etc          -pain control          -sleep wake cycle regulation; avoid day time somnolence           -supportive environmenta        Thank you for allowing me to participate in the care of this patient. Will continue to follow patient periodically. Please do not hesitate to call me if you have any  questions or if there has been a change in patients neurological status     ________________  Ashley Harper MD  [facility] ([facility]  [phone]      33 minutes spent on total encounter; more than 50 % of the visit was  spent counseling about plan of care, compliance to diet/exercise and medication regimen and or  coordinating care by the attending physician.      It is advised that stroke patients follow up with VITALY Helm @ [phone] in 1- 2 weeks.   Others please follow up with Dr. Michael Nissenbaum [phone]

## 2022-07-03 ENCOUNTER — TRANSCRIPTION ENCOUNTER (OUTPATIENT)
Age: 87
End: 2022-07-03

## 2022-07-03 VITALS
RESPIRATION RATE: 18 BRPM | OXYGEN SATURATION: 98 % | TEMPERATURE: 98 F | SYSTOLIC BLOOD PRESSURE: 139 MMHG | DIASTOLIC BLOOD PRESSURE: 62 MMHG

## 2022-07-03 PROCEDURE — 97161 PT EVAL LOW COMPLEX 20 MIN: CPT

## 2022-07-03 PROCEDURE — 81001 URINALYSIS AUTO W/SCOPE: CPT

## 2022-07-03 PROCEDURE — 82550 ASSAY OF CK (CPK): CPT

## 2022-07-03 PROCEDURE — 73552 X-RAY EXAM OF FEMUR 2/>: CPT

## 2022-07-03 PROCEDURE — 71045 X-RAY EXAM CHEST 1 VIEW: CPT

## 2022-07-03 PROCEDURE — 73562 X-RAY EXAM OF KNEE 3: CPT

## 2022-07-03 PROCEDURE — 85025 COMPLETE CBC W/AUTO DIFF WBC: CPT

## 2022-07-03 PROCEDURE — 87077 CULTURE AEROBIC IDENTIFY: CPT

## 2022-07-03 PROCEDURE — 96374 THER/PROPH/DIAG INJ IV PUSH: CPT

## 2022-07-03 PROCEDURE — 73502 X-RAY EXAM HIP UNI 2-3 VIEWS: CPT

## 2022-07-03 PROCEDURE — 36415 COLL VENOUS BLD VENIPUNCTURE: CPT

## 2022-07-03 PROCEDURE — 87186 SC STD MICRODIL/AGAR DIL: CPT

## 2022-07-03 PROCEDURE — 99285 EMERGENCY DEPT VISIT HI MDM: CPT

## 2022-07-03 PROCEDURE — 87086 URINE CULTURE/COLONY COUNT: CPT

## 2022-07-03 PROCEDURE — 87637 SARSCOV2&INF A&B&RSV AMP PRB: CPT

## 2022-07-03 PROCEDURE — 80053 COMPREHEN METABOLIC PANEL: CPT

## 2022-07-03 RX ORDER — CEFUROXIME AXETIL 250 MG
1 TABLET ORAL
Qty: 12 | Refills: 0
Start: 2022-07-03 | End: 2022-07-08

## 2022-07-03 RX ORDER — LIDOCAINE 4 G/100G
1 CREAM TOPICAL
Qty: 30 | Refills: 0
Start: 2022-07-03 | End: 2022-08-01

## 2022-07-03 RX ORDER — CEFUROXIME AXETIL 250 MG
250 TABLET ORAL EVERY 12 HOURS
Refills: 0 | Status: DISCONTINUED | OUTPATIENT
Start: 2022-07-03 | End: 2022-07-03

## 2022-07-03 RX ORDER — MEMANTINE HYDROCHLORIDE 10 MG/1
1 TABLET ORAL
Qty: 60 | Refills: 0
Start: 2022-07-03 | End: 2022-08-01

## 2022-07-03 RX ORDER — MELOXICAM 15 MG/1
1 TABLET ORAL
Qty: 0 | Refills: 0 | DISCHARGE

## 2022-07-03 RX ORDER — DONEPEZIL HYDROCHLORIDE 10 MG/1
1 TABLET, FILM COATED ORAL
Qty: 0 | Refills: 0 | DISCHARGE

## 2022-07-03 RX ORDER — IBANDRONATE SODIUM 150 MG/1
1 TABLET ORAL
Qty: 0 | Refills: 0 | DISCHARGE

## 2022-07-03 RX ORDER — GABAPENTIN 400 MG/1
1 CAPSULE ORAL
Qty: 60 | Refills: 0
Start: 2022-07-03 | End: 2022-08-01

## 2022-07-03 RX ADMIN — CELECOXIB 200 MILLIGRAM(S): 200 CAPSULE ORAL at 12:07

## 2022-07-03 RX ADMIN — Medication 81 MILLIGRAM(S): at 12:07

## 2022-07-03 RX ADMIN — Medication 1 TABLET(S): at 05:23

## 2022-07-03 RX ADMIN — GABAPENTIN 100 MILLIGRAM(S): 400 CAPSULE ORAL at 05:24

## 2022-07-03 RX ADMIN — LIDOCAINE 1 PATCH: 4 CREAM TOPICAL at 12:07

## 2022-07-03 RX ADMIN — LOSARTAN POTASSIUM 100 MILLIGRAM(S): 100 TABLET, FILM COATED ORAL at 05:23

## 2022-07-03 RX ADMIN — CELECOXIB 200 MILLIGRAM(S): 200 CAPSULE ORAL at 13:04

## 2022-07-03 RX ADMIN — Medication 60 MILLIGRAM(S): at 05:23

## 2022-07-03 RX ADMIN — Medication 5 MILLIGRAM(S): at 12:07

## 2022-07-03 RX ADMIN — AMLODIPINE BESYLATE 5 MILLIGRAM(S): 2.5 TABLET ORAL at 05:23

## 2022-07-03 RX ADMIN — MEMANTINE HYDROCHLORIDE 5 MILLIGRAM(S): 10 TABLET ORAL at 05:25

## 2022-07-03 RX ADMIN — LIDOCAINE 1 PATCH: 4 CREAM TOPICAL at 01:06

## 2022-07-03 NOTE — PROGRESS NOTE ADULT - SUBJECTIVE AND OBJECTIVE BOX
[facility]      Seen earlier today, and examined.  - Today, patient is without complaints.           *****MEDICATIONS: Current medication reviewed and documented.    MEDICATIONS  (STANDING):  amLODIPine   Tablet 5 milliGRAM(s) Oral daily  aspirin enteric coated 81 milliGRAM(s) Oral daily  atorvastatin 40 milliGRAM(s) Oral at bedtime  calcium carbonate 1250 mG  + Vitamin D (OsCal 500 + D) 1 Tablet(s) Oral two times a day  cefuroxime   Tablet 250 milliGRAM(s) Oral every 12 hours  celecoxib 200 milliGRAM(s) Oral daily  enoxaparin Injectable 30 milliGRAM(s) SubCutaneous every 24 hours  gabapentin 100 milliGRAM(s) Oral two times a day  lidocaine   4% Patch 1 Patch Transdermal daily  losartan 100 milliGRAM(s) Oral daily  memantine 5 milliGRAM(s) Oral two times a day  oxybutynin 5 milliGRAM(s) Oral daily  propranolol LA 60 milliGRAM(s) Oral daily    MEDICATIONS  (PRN):  acetaminophen     Tablet .. 650 milliGRAM(s) Oral every 6 hours PRN Temp greater or equal to 38C (100.4F), Mild Pain (1 - 3)  traMADol 50 milliGRAM(s) Oral every 6 hours PRN Mild Pain (1 - 3)          ***** VITAL SIGNS:  T(F): 98.2 (07-03-22 @ 13:51), Max: 98.2 (07-03-22 @ 13:51)  HR: 56 (07-02-22 @ 23:14) (56 - 56)  BP: 139/62 (07-03-22 @ 13:51) (116/68 - 164/83)  RR: 18 (07-03-22 @ 13:51) (18 - 18)  SpO2: 98% (07-03-22 @ 13:51) (98% - 98%)  Wt(kg): --  ,   I&O's Summary    02 Jul 2022 07:01  -  03 Jul 2022 07:00  --------------------------------------------------------  IN: 250 mL / OUT: 0 mL / NET: 250 mL             *****PHYSICAL EXAM:   alert oriented x 3 attention comprehension are fair.  Able to name, repeat.   EOmi fundi not visualized   no nystagmus VFF to confrontation  Tongue is midline  Palate elevates symmetrically   Moving all 4 ext spontaneously no drift appreciated    Gait not assessed.            *****LAB AND IMAGING:                                         [All pertinent recent Imaging/Reports reviewed]           *****A S S E S S M E N T   A N D   P L A N :      Excerpt from H&P,":  89 yo F hx HTN, dementia (ao x1 at baseline per son to self/birthdate), presenting with 6 months R hip pain now with inability to ambulate since today. Son at bedside to provide hx. Patient and son denying any recent/past falls, patient  AOx3 at time of interview. Patient localizes pain to lateral R hip and anterior R thigh. Has hx compression fracture now s/p spinal surgery. Had MRI 5/30 showing T12-L1 spinal stenosis. Currently denying any back pain, no chest pain/sob, no numbness/tingling down RLE, no urinary/bowel retention/incontinence. No nausea/vomiting, fever/chills. Uses walker at baseline, unable to ambulate today 2/2 pain.         Problem/Recommendations 1: r hip pain 5/30 mri with t12/l1 stenosis   exam c/w l 1 radiculopathy   r hip xrays unremarkable   gabapentin 100 bid (hold for sedation)   lidocaine patch   pt eval   oob to chair as tolerated   denies any incontinence or saddle anesthesia   will continue to monitor         Problem/Recommendations 2:    ams   waxing and waning mental status due to underlying dementia     Thank you for allowing me to participate in the care of this patient. Will continue to follow patient periodically. Please do not hesitate to call me if you have any  questions or if there has been a change in patients neurological status     ________________  Ashley Harper MD  [facility] ([facility]  [phone]      33 minutes spent on total encounter; more than 50 % of the visit was  spent counseling about plan of care, compliance to diet/exercise and medication regimen and or  coordinating care by the attending physician.      It is advised that stroke patients follow up with VITALY Helm @ [phone] in 1- 2 weeks.   Others please follow up with Dr. Michael Nissenbaum [phone]

## 2022-07-03 NOTE — PROGRESS NOTE ADULT - SUBJECTIVE AND OBJECTIVE BOX
Patient is a 88y old  Female who presents with a chief complaint of functional paraplegia (02 Jul 2022 15:59)      SUBJECTIVE / OVERNIGHT EVENTS: Klebs sensitivities are back. will dc home on Ceftin, will need home PT    MEDICATIONS  (STANDING):  amLODIPine   Tablet 5 milliGRAM(s) Oral daily  aspirin enteric coated 81 milliGRAM(s) Oral daily  atorvastatin 40 milliGRAM(s) Oral at bedtime  calcium carbonate 1250 mG  + Vitamin D (OsCal 500 + D) 1 Tablet(s) Oral two times a day  cefTRIAXone   IVPB      cefTRIAXone   IVPB 1000 milliGRAM(s) IV Intermittent every 24 hours  celecoxib 200 milliGRAM(s) Oral daily  enoxaparin Injectable 30 milliGRAM(s) SubCutaneous every 24 hours  gabapentin 100 milliGRAM(s) Oral two times a day  lidocaine   4% Patch 1 Patch Transdermal daily  losartan 100 milliGRAM(s) Oral daily  memantine 5 milliGRAM(s) Oral two times a day  oxybutynin 5 milliGRAM(s) Oral daily  propranolol LA 60 milliGRAM(s) Oral daily    MEDICATIONS  (PRN):  acetaminophen     Tablet .. 650 milliGRAM(s) Oral every 6 hours PRN Temp greater or equal to 38C (100.4F), Mild Pain (1 - 3)  traMADol 50 milliGRAM(s) Oral every 6 hours PRN Mild Pain (1 - 3)      Vital Signs Last 24 Hrs  T(F): 98 (07-02-22 @ 23:14), Max: 98.2 (07-02-22 @ 15:48)  HR: 56 (07-02-22 @ 23:14) (54 - 61)  BP: 164/83 (07-03-22 @ 05:25) (116/68 - 164/83)  RR: 18 (07-02-22 @ 23:14) (18 - 18)  SpO2: 98% (07-02-22 @ 23:14) (98% - 99%)  Telemetry:   CAPILLARY BLOOD GLUCOSE        I&O's Summary    02 Jul 2022 07:01  -  03 Jul 2022 07:00  --------------------------------------------------------  IN: 250 mL / OUT: 0 mL / NET: 250 mL        PHYSICAL EXAM:  GENERAL: NAD, well-developed  HEAD:  Atraumatic, Normocephalic  EYES: EOMI, PERRLA, conjunctiva and sclera clear  NECK: Supple, No JVD  CHEST/LUNG: Clear to auscultation bilaterally; No wheeze  HEART: Regular rate and rhythm; No murmurs, rubs, or gallops  ABDOMEN: Soft, Nontender, Nondistended; Bowel sounds present  EXTREMITIES:  2+ Peripheral Pulses, No clubbing, cyanosis, or edema  PSYCH: AAOx3  NEUROLOGY: non-focal  SKIN: No rashes or lesions    LABS:                    RADIOLOGY & ADDITIONAL TESTS:    Imaging Personally Reviewed:    Consultant(s) Notes Reviewed:      Care Discussed with Consultants/Other Providers:

## 2022-07-03 NOTE — DISCHARGE NOTE PROVIDER - CARE PROVIDERS DIRECT ADDRESSES
[email or web address]

## 2022-07-03 NOTE — DISCHARGE NOTE PROVIDER - PROVIDER TOKENS
PROVIDER:[TOKEN:[[id number]],FOLLOWUP:[2 weeks]] PROVIDER:[TOKEN:[[id number]],FOLLOWUP:[2 weeks]],PROVIDER:[TOKEN:[[id number]],FOLLOWUP:[2 weeks]]

## 2022-07-03 NOTE — DISCHARGE NOTE NURSING/CASE MANAGEMENT/SOCIAL WORK - PATIENT PORTAL LINK FT
You can access the FollowMyHealth Patient Portal offered by [facility] by registering at the following website: [email or web address]. By joining [facility]’s FollowMyHealth portal, you will also be able to view your health information using other applications (apps) compatible with our system.

## 2022-07-03 NOTE — PROGRESS NOTE ADULT - ASSESSMENT
87 yo F hx HTN, dementia (ao x1 at baseline per son to self/birthdate), presenting with 6 months R hip pain now with inability to ambulate since today. Son at bedside to provide hx. Patient and son denying any recent/past falls, patient  AOx3 at time of interview. Patient localizes pain to lateral R hip and anterior R thigh. Has hx compression fracture now s/p spinal surgery. Had MRI 5/30 showing T12-L1 spinal stenosis. Currently denying any back pain, no chest pain/sob, no numbness/tingling down RLE, no urinary/bowel retention/incontinence. No nausea/vomiting, fever/chills. Uses walker at baseline, unable to ambulate today 2/2 pain.     - ptn is nontoxic appearing  - Urine Cx growing Klebsiella, ptn is asymptomatic, possibly colonized. complete the course w po CEFTIN  - CXR is clear  - PT eval w dispo home w home PT. dc home  - DVT ppx w sc Lovenox  - neuro eval reviewed 
89 yo F hx HTN, dementia (ao x1 at baseline per son to self/birthdate), presenting with 6 months R hip pain now with inability to ambulate since today. Son at bedside to provide hx. Patient and son denying any recent/past falls, patient  AOx3 at time of interview. Patient localizes pain to lateral R hip and anterior R thigh. Has hx compression fracture now s/p spinal surgery. Had MRI 5/30 showing T12-L1 spinal stenosis. Currently denying any back pain, no chest pain/sob, no numbness/tingling down RLE, no urinary/bowel retention/incontinence. No nausea/vomiting, fever/chills. Uses walker at baseline, unable to ambulate today 2/2 pain.     - ptn is nontoxic appearing  - Urine Cx growing Klebsiella, ptn is asymptomatic, possibly colonized. will cont  CTX,  ID consult called. dc planning once sensitivities are available  - CXR is clear  - PT eval w dispo home w home PT  - DVT ppx w sc Lovenox  - neuro eval reviewed 
89 yo F hx HTN, dementia (ao x1 at baseline per son to self/birthdate), presenting with 6 months R hip pain now with inability to ambulate since today. Son at bedside to provide hx. Patient and son denying any recent/past falls, patient  AOx3 at time of interview. Patient localizes pain to lateral R hip and anterior R thigh. Has hx compression fracture now s/p spinal surgery. Had MRI 5/30 showing T12-L1 spinal stenosis. Currently denying any back pain, no chest pain/sob, no numbness/tingling down RLE, no urinary/bowel retention/incontinence. No nausea/vomiting, fever/chills. Uses walker at baseline, unable to ambulate today 2/2 pain.     - ptn is nontoxic appearing  - Urine Cx growing GNR, ptn is asymptomatic, possibly colonized. will start CTX and get ID consult  - CXR is clear  - PT eval w dispo home w home PT  - DVT ppx w sc Lovenox  - neuro eval reviewed

## 2022-07-03 NOTE — DISCHARGE NOTE PROVIDER - NSDCCPCAREPLAN_GEN_ALL_CORE_FT
PRINCIPAL DISCHARGE DIAGNOSIS  Diagnosis: Right hip pain  Assessment and Plan of Treatment: Take pain medicaion as prescribed.  Plan for home PT.      SECONDARY DISCHARGE DIAGNOSES  Diagnosis: UTI (urinary tract infection)  Assessment and Plan of Treatment: Take all antibiotics as ordered.  Call you Health care provider upon arrival home to make a one week follow up appointment.  If you develop fever, chills, malaise, or change in mental status call your Health Care Provider or go to the Emergency Department.  Nutrition is important, eat small frequent meals to help ensure you get adequate calories.

## 2022-07-03 NOTE — DISCHARGE NOTE PROVIDER - CARE PROVIDER_API CALL
Raissa Gerardo  Internal Medicine  [address] [facility] [place] [address]  Phone: ([phone]  Fax: [phone]  Follow Up Time: 2 weeks   [facility]ardo[facility]  Internal Medicine  [address] [place] [address]  Phone: ()-  Fax: [phone]  Follow Up Time: 2 weeks    Nissenbaum, Michael A (MD)  Neurology  [address], [address]  [place] [address]  Phone: [phone]  Fax: [phone]  Follow Up Time: 2 weeks

## 2022-07-03 NOTE — PROGRESS NOTE ADULT - REASON FOR ADMISSION
functional paraplegia

## 2022-07-03 NOTE — DISCHARGE NOTE PROVIDER - HOSPITAL COURSE
88 year old female with past medical history of hypertension, dementia (at baseline AOx 1), presented to ED with 6 months of right hip pain and inability to ambulate.  Family denied recent falls.  During ED assessment AOx 3, patient was able to localize pain to right lateral hip and right anterior thigh.  History of compression fractures and s/p spinal surgery.  MRI 5/30 with spinal stenosis T12-L1.  During admission patient remain nontoxic appearing.  Neurology consulted, right hip xrays unremarkable, exam consistent with radiculopathy.  Recommendations included gabapentin and lidocaine patch for pain.  Also recommended preventative measures for delirium.  CXR negative.  Urine culture with Klebsiella pneemoniae, patient is asymptomatic, possibly colonized.  ID consulted, plan to complete course of PO Ceftin.  ID also consulted for right hip, no overlying area of cellulitis, s/p multiple x-rays- No fractures or dislocations Preserved bilateral hip joint spaces. Right knee tricompartment osteoarthritis with small knee joint effusion. No joint margin erosions. Generalized osteopenia otherwise no discrete suspicious lytic or blastic lesions.  DVT prophylaxis with Lovenox during admission.  Plan for home dispo with PT.               88 year old female with past medical history of hypertension, dementia (at baseline AOx 1), presented to ED with 6 months of right hip pain and inability to ambulate.  Family denied recent falls.  During ED assessment AOx 3, patient was able to localize pain to right lateral hip and right anterior thigh.  History of compression fractures and s/p spinal surgery.  MRI 5/30 with spinal stenosis T12-L1.  During admission patient remain nontoxic appearing.  Neurology consulted, right hip xrays unremarkable, exam consistent with radiculopathy.  Recommendations included gabapentin and lidocaine patch for pain.  Also recommended preventative measures for delirium.  CXR negative.  Urine culture with Klebsiella pneemoniae, patient is asymptomatic, possibly colonized.  ID consulted, plan to complete course of PO Ceftin.  ID also consulted for right hip, no overlying area of cellulitis, s/p multiple x-rays- No fractures or dislocations Preserved bilateral hip joint spaces. Right knee tricompartment osteoarthritis with small knee joint effusion. No joint margin erosions. Generalized osteopenia otherwise no discrete suspicious lytic or blastic lesions.  DVT prophylaxis with Lovenox during admission.  Plan for home dispo with PT.               88 year old female with past medical history of hypertension, dementia (at baseline AOx 1), presented to ED with 6 months of right hip pain and inability to ambulate.  Family denied recent falls.  During ED assessment AOx 3, patient was able to localize pain to right lateral hip and right anterior thigh.  History of compression fractures and s/p spinal surgery.  MRI 5/30 with spinal stenosis T12-L1.  During admission patient remain nontoxic appearing.  Neurology consulted, right hip xrays unremarkable, exam consistent with radiculopathy.  Recommendations included gabapentin and lidocaine patch for pain.  Also recommended preventative measures for delirium.  CXR negative.  Urine culture with Klebsiella pneemoniae, patient is asymptomatic, possibly colonized.  ID consulted, plan to complete course of PO Ceftin.  ID also consulted for right hip, no overlying area of cellulitis, s/p multiple x-rays- No fractures or dislocations Preserved bilateral hip joint spaces. Right knee tricompartment osteoarthritis with small knee joint effusion. No joint margin erosions. Generalized osteopenia otherwise no discrete suspicious lytic or blastic lesions.  DVT prophylaxis with Lovenox during admission.  Plan for home dispo with PT.      DCP and medication reconciliation discussed with Dr Montoya and in agreement. Patient is hemodynamically stable and cleared for discharge. Patient will follow up with PMD and Neuro

## 2022-07-03 NOTE — DISCHARGE NOTE PROVIDER - NSDCMRMEDTOKEN_GEN_ALL_CORE_FT
acetaminophen 500 mg oral tablet: 1 tab(s) orally every 6 hours  amLODIPine 5 mg oral tablet: 1 tab(s) orally once a day  aspirin 81 mg oral delayed release tablet: 1 tab(s) orally once a day  atorvastatin 40 mg oral tablet: 1 tab(s) orally once a day  celecoxib 200 mg oral capsule: 1 cap(s) orally once a day  donepezil 5 mg oral tablet: 1 tab(s) orally once a day (at bedtime)  ibandronate 150 mg oral tablet: 1 tab(s) orally once a month  losartan 100 mg oral tablet: 1 tab(s) orally once a day  meloxicam 7.5 mg oral tablet: 1 tab(s) orally once a day  oxybutynin 5 mg oral tablet: 1 tab(s) orally once a day  Oysco 500 with D 500 mg-5 mcg (200 intl units) oral tablet: 1 tab(s) orally 2 times a day  pregabalin 100 mg oral capsule: 1 cap(s) orally 3 times a day.    NOTE: Pharmacy last dispensed on May 24th for 30 days supply  propranolol 60 mg oral capsule, extended release: 1 cap(s) orally once a day  traMADol 50 mg oral tablet: 1 tab(s) orally every 6 hours, As Needed   acetaminophen 500 mg oral tablet: 1 tab(s) orally every 6 hours  amLODIPine 5 mg oral tablet: 1 tab(s) orally once a day  aspirin 81 mg oral delayed release tablet: 1 tab(s) orally once a day  atorvastatin 40 mg oral tablet: 1 tab(s) orally once a day  cefuroxime 250 mg oral tablet: 1 tab(s) orally every 12 hours  celecoxib 200 mg oral capsule: 1 cap(s) orally once a day  gabapentin 100 mg oral capsule: 1 cap(s) orally 2 times a day  lidocaine 4% topical film: Apply topically to affected area once a day   losartan 100 mg oral tablet: 1 tab(s) orally once a day  memantine 5 mg oral tablet: 1 tab(s) orally 2 times a day  oxybutynin 5 mg oral tablet: 1 tab(s) orally once a day  Oysco 500 with D 500 mg-5 mcg (200 intl units) oral tablet: 1 tab(s) orally 2 times a day  propranolol 60 mg oral capsule, extended release: 1 cap(s) orally once a day  traMADol 50 mg oral tablet: 1 tab(s) orally every 6 hours, As Needed

## 2022-07-03 NOTE — DISCHARGE NOTE NURSING/CASE MANAGEMENT/SOCIAL WORK - NSDCPEFALRISK_GEN_ALL_CORE
For information on Fall & Injury Prevention, visit: [email or web address] OR  [email or web address] OR  https://www.cdc.gov/steadi/patient.html

## 2023-02-08 ENCOUNTER — INPATIENT (INPATIENT)
Facility: HOSPITAL | Age: 88
LOS: 4 days | Discharge: NURSING, REHAB OR LONG-TERM CARE | DRG: 884 | End: 2023-02-13
Attending: INTERNAL MEDICINE | Admitting: INTERNAL MEDICINE
Payer: MEDICARE

## 2023-02-08 VITALS
TEMPERATURE: 98 F | HEIGHT: 62 IN | DIASTOLIC BLOOD PRESSURE: 90 MMHG | HEART RATE: 64 BPM | WEIGHT: 110.01 LBS | SYSTOLIC BLOOD PRESSURE: 154 MMHG | OXYGEN SATURATION: 98 % | RESPIRATION RATE: 16 BRPM

## 2023-02-08 DIAGNOSIS — Z98.890 OTHER SPECIFIED POSTPROCEDURAL STATES: Chronic | ICD-10-CM

## 2023-02-08 DIAGNOSIS — R62.7 ADULT FAILURE TO THRIVE: ICD-10-CM

## 2023-02-08 PROBLEM — F03.90 UNSPECIFIED DEMENTIA WITHOUT BEHAVIORAL DISTURBANCE: Chronic | Status: ACTIVE | Noted: 2022-06-30

## 2023-02-08 LAB
ALBUMIN SERPL ELPH-MCNC: 3.8 G/DL — SIGNIFICANT CHANGE UP (ref 3.3–5)
ALP SERPL-CCNC: 73 U/L — SIGNIFICANT CHANGE UP (ref 40–120)
ALT FLD-CCNC: 24 U/L — SIGNIFICANT CHANGE UP (ref 10–45)
ANION GAP SERPL CALC-SCNC: 11 MMOL/L — SIGNIFICANT CHANGE UP (ref 5–17)
APPEARANCE UR: ABNORMAL
APTT BLD: 29.8 SEC — SIGNIFICANT CHANGE UP (ref 27.5–35.5)
AST SERPL-CCNC: 53 U/L — HIGH (ref 10–40)
BACTERIA # UR AUTO: NEGATIVE — SIGNIFICANT CHANGE UP
BASOPHILS # BLD AUTO: 0.02 K/UL — SIGNIFICANT CHANGE UP (ref 0–0.2)
BASOPHILS NFR BLD AUTO: 0.4 % — SIGNIFICANT CHANGE UP (ref 0–2)
BILIRUB SERPL-MCNC: 0.5 MG/DL — SIGNIFICANT CHANGE UP (ref 0.2–1.2)
BILIRUB UR-MCNC: NEGATIVE — SIGNIFICANT CHANGE UP
BUN SERPL-MCNC: 19 MG/DL — SIGNIFICANT CHANGE UP (ref 7–23)
CALCIUM SERPL-MCNC: 10.1 MG/DL — SIGNIFICANT CHANGE UP (ref 8.4–10.5)
CHLORIDE SERPL-SCNC: 106 MMOL/L — SIGNIFICANT CHANGE UP (ref 96–108)
CO2 SERPL-SCNC: 24 MMOL/L — SIGNIFICANT CHANGE UP (ref 22–31)
COLOR SPEC: SIGNIFICANT CHANGE UP
CREAT SERPL-MCNC: 1.06 MG/DL — SIGNIFICANT CHANGE UP (ref 0.5–1.3)
DIFF PNL FLD: NEGATIVE — SIGNIFICANT CHANGE UP
EGFR: 50 ML/MIN/1.73M2 — LOW
EOSINOPHIL # BLD AUTO: 0.03 K/UL — SIGNIFICANT CHANGE UP (ref 0–0.5)
EOSINOPHIL NFR BLD AUTO: 0.6 % — SIGNIFICANT CHANGE UP (ref 0–6)
EPI CELLS # UR: 1 /HPF — SIGNIFICANT CHANGE UP
GLUCOSE SERPL-MCNC: 77 MG/DL — SIGNIFICANT CHANGE UP (ref 70–99)
GLUCOSE UR QL: NEGATIVE — SIGNIFICANT CHANGE UP
HCT VFR BLD CALC: 33.9 % — LOW (ref 34.5–45)
HGB BLD-MCNC: 11 G/DL — LOW (ref 11.5–15.5)
HYALINE CASTS # UR AUTO: 3 /LPF — HIGH (ref 0–2)
IMM GRANULOCYTES NFR BLD AUTO: 0.2 % — SIGNIFICANT CHANGE UP (ref 0–0.9)
INR BLD: 0.92 RATIO — SIGNIFICANT CHANGE UP (ref 0.88–1.16)
KETONES UR-MCNC: NEGATIVE — SIGNIFICANT CHANGE UP
LEUKOCYTE ESTERASE UR-ACNC: ABNORMAL
LYMPHOCYTES # BLD AUTO: 1.69 K/UL — SIGNIFICANT CHANGE UP (ref 1–3.3)
LYMPHOCYTES # BLD AUTO: 34.1 % — SIGNIFICANT CHANGE UP (ref 13–44)
MCHC RBC-ENTMCNC: 31.1 PG — SIGNIFICANT CHANGE UP (ref 27–34)
MCHC RBC-ENTMCNC: 32.4 GM/DL — SIGNIFICANT CHANGE UP (ref 32–36)
MCV RBC AUTO: 95.8 FL — SIGNIFICANT CHANGE UP (ref 80–100)
MONOCYTES # BLD AUTO: 0.46 K/UL — SIGNIFICANT CHANGE UP (ref 0–0.9)
MONOCYTES NFR BLD AUTO: 9.3 % — SIGNIFICANT CHANGE UP (ref 2–14)
NEUTROPHILS # BLD AUTO: 2.74 K/UL — SIGNIFICANT CHANGE UP (ref 1.8–7.4)
NEUTROPHILS NFR BLD AUTO: 55.4 % — SIGNIFICANT CHANGE UP (ref 43–77)
NITRITE UR-MCNC: POSITIVE
NRBC # BLD: 0 /100 WBCS — SIGNIFICANT CHANGE UP (ref 0–0)
PH UR: 7 — SIGNIFICANT CHANGE UP (ref 5–8)
PLATELET # BLD AUTO: 235 K/UL — SIGNIFICANT CHANGE UP (ref 150–400)
POTASSIUM SERPL-MCNC: 4.6 MMOL/L — SIGNIFICANT CHANGE UP (ref 3.5–5.3)
POTASSIUM SERPL-SCNC: 4.6 MMOL/L — SIGNIFICANT CHANGE UP (ref 3.5–5.3)
PROT SERPL-MCNC: 7.3 G/DL — SIGNIFICANT CHANGE UP (ref 6–8.3)
PROT UR-MCNC: SIGNIFICANT CHANGE UP
PROTHROM AB SERPL-ACNC: 10.6 SEC — SIGNIFICANT CHANGE UP (ref 10.5–13.4)
RAPID RVP RESULT: SIGNIFICANT CHANGE UP
RBC # BLD: 3.54 M/UL — LOW (ref 3.8–5.2)
RBC # FLD: 14.3 % — SIGNIFICANT CHANGE UP (ref 10.3–14.5)
RBC CASTS # UR COMP ASSIST: 1 /HPF — SIGNIFICANT CHANGE UP (ref 0–4)
SARS-COV-2 RNA SPEC QL NAA+PROBE: SIGNIFICANT CHANGE UP
SODIUM SERPL-SCNC: 141 MMOL/L — SIGNIFICANT CHANGE UP (ref 135–145)
SP GR SPEC: 1.02 — SIGNIFICANT CHANGE UP (ref 1.01–1.02)
UROBILINOGEN FLD QL: NEGATIVE — SIGNIFICANT CHANGE UP
WBC # BLD: 4.95 K/UL — SIGNIFICANT CHANGE UP (ref 3.8–10.5)
WBC # FLD AUTO: 4.95 K/UL — SIGNIFICANT CHANGE UP (ref 3.8–10.5)
WBC UR QL: 1 /HPF — SIGNIFICANT CHANGE UP (ref 0–5)

## 2023-02-08 PROCEDURE — 73502 X-RAY EXAM HIP UNI 2-3 VIEWS: CPT | Mod: 26,RT

## 2023-02-08 PROCEDURE — 71045 X-RAY EXAM CHEST 1 VIEW: CPT | Mod: 26

## 2023-02-08 PROCEDURE — 99285 EMERGENCY DEPT VISIT HI MDM: CPT

## 2023-02-08 RX ORDER — ASPIRIN/CALCIUM CARB/MAGNESIUM 324 MG
81 TABLET ORAL DAILY
Refills: 0 | Status: DISCONTINUED | OUTPATIENT
Start: 2023-02-08 | End: 2023-02-13

## 2023-02-08 RX ORDER — HEPARIN SODIUM 5000 [USP'U]/ML
5000 INJECTION INTRAVENOUS; SUBCUTANEOUS EVERY 8 HOURS
Refills: 0 | Status: DISCONTINUED | OUTPATIENT
Start: 2023-02-08 | End: 2023-02-13

## 2023-02-08 RX ORDER — ACETAMINOPHEN 500 MG
650 TABLET ORAL EVERY 6 HOURS
Refills: 0 | Status: DISCONTINUED | OUTPATIENT
Start: 2023-02-08 | End: 2023-02-13

## 2023-02-08 RX ADMIN — HEPARIN SODIUM 5000 UNIT(S): 5000 INJECTION INTRAVENOUS; SUBCUTANEOUS at 23:16

## 2023-02-08 NOTE — ED ADULT NURSE REASSESSMENT NOTE - NS ED NURSE REASSESS COMMENT FT1
Bladder scanned the pt and found 287ml of urine in bladder. Pt is tolerated well.  at bedside. Bed is locked and at lowest position. Pt pending dispo.

## 2023-02-08 NOTE — H&P ADULT - NSHPLABSRESULTS_GEN_ALL_CORE
LABS:                        11.0   4.95  )-----------( 235      ( 08 Feb 2023 11:40 )             33.9     02-08    141  |  106  |  19  ----------------------------<  77  4.6   |  24  |  1.06    Ca    10.1      08 Feb 2023 11:40    TPro  7.3  /  Alb  3.8  /  TBili  0.5  /  DBili  x   /  AST  53<H>  /  ALT  24  /  AlkPhos  73  02-08    PT/INR - ( 08 Feb 2023 11:40 )   PT: 10.6 sec;   INR: 0.92 ratio         PTT - ( 08 Feb 2023 11:40 )  PTT:29.8 sec          RADIOLOGY & ADDITIONAL TESTS:

## 2023-02-08 NOTE — ED ADULT NURSE NOTE - CAS EDP DISCH DISPOSITION ADMI
Next Visit Date:  Future Appointments   Date Time Provider Lizette Gonzalez   8/5/2020  7:45 AM [facility] [address] Maintenance   Topic Date Due    Diabetes screen  11/07/2019    Lipid screen  08/09/2020    Shingles Vaccine (1 of 2) 02/05/2021 (Originally 7/5/2006)    Flu vaccine (1) 09/01/2020    Colon Cancer Screen FIT/FOBT  02/12/2021    Potassium monitoring  07/28/2021    Creatinine monitoring  07/28/2021    DTaP/Tdap/Td vaccine (2 - Td) 09/19/2026    Pneumococcal 0-64 years Vaccine  Completed    Hepatitis C screen  Completed    HIV screen  Completed    Hepatitis A vaccine  Aged Out    Hepatitis B vaccine  Aged Out    Hib vaccine  Aged Out    Meningococcal (ACWY) vaccine  Aged Out       Hemoglobin A1C (%)   Date Value   11/07/2016 4.8   11/21/2013 5.1             ( goal A1C is < 7)   No results found for: LABMICR  LDL Cholesterol (mg/dL)   Date Value   08/09/2019 65   01/29/2018 40       (goal LDL is <100)   AST (U/L)   Date Value   07/28/2020 26     ALT (U/L)   Date Value   07/28/2020 22     BUN (mg/dL)   Date Value   07/28/2020 10   07/28/2020 10     BP Readings from Last 3 Encounters:   02/05/20 (!) 152/89   08/06/19 114/62   07/24/18 136/85          (goal 120/80)    All Future Testing planned in [facility]              Patient Active Problem List:     Burst fracture of lumbar vertebra (HCC)     Tobacco abuse     HLD (hyperlipidemia)     Iron deficiency     Cervical stenosis of spine     Essential hypertension     Cervical radiculopathy
5 Isidro Cespedes2W/[facility]

## 2023-02-08 NOTE — ED ADULT NURSE REASSESSMENT NOTE - NS ED NURSE REASSESS COMMENT FT1
Hebrew  used #[id number]. pt was informed of needing urine sample, was placed on a primafit, was explained and pt verbalized understanding and approval. Son at bedside.

## 2023-02-08 NOTE — H&P ADULT - ASSESSMENT
89 yo female with pmh dementia, HTN, on [id number] daily here with increased generalized weakness and decreased PO since yesterday. Pts son at bedside providing history and translation. States pt usually ambulatory with walker at baseline and over the past 48 hours has been having worsening weakness. Per son, pt has been complaining of some mild R hip pain but denies trauma or injury. No fevers, chills, nausea, vomiting, abdominal pain, shortness of breath, urinary complaints, no cough.    FTT  - nutrition consult  - follow up UA  - PT eval    Dementia  - b12 , folate, tsh    HTN  - c/w home meds    dvt px   sq heparin

## 2023-02-08 NOTE — ED PROVIDER NOTE - OBJECTIVE STATEMENT
88 yo female with pmh dementia, HTN, on [id number] daily here with increased generalized weakness and decreased PO since yesterday. Pts son at bedside providing history and translation. States pt usually ambulatory with walker at baseline and over the past 48 hours has been having worsening weakness. Per son, pt has been complaining of some mild R hip pain but denies trauma or injury. No fevers, chills, nausea, vomiting, abdominal pain, shortness of breath, urinary complaints, no cough. 88 yo female with pmh dementia, HTN, on [id number] daily here with increased generalized weakness and decreased PO since yesterday. Pts son at bedside providing history and translation. States pt usually ambulatory with walker at baseline and over the past 48 hours has been having worsening weakness. Per son, pt has been complaining of some mild R hip pain but denies trauma or injury. No fevers, chills, nausea, vomiting, abdominal pain, shortness of breath, urinary complaints, no cough.    Attendinyo female presents with weakness and decreased po since yesterday.  no vomiting.  has worsening weakness.  difficulty with ambulation, even with the walker.

## 2023-02-08 NOTE — ED ADULT NURSE NOTE - OBJECTIVE STATEMENT
report received from prior RN. Pt is 89yr old F as per son pt is primary Amharic speaking is A&Ox2 to self and situation with confusion, pt has had decreased PO since yesterday & difficulty walking due to weakness.

## 2023-02-08 NOTE — ED ADULT NURSE REASSESSMENT NOTE - NS ED NURSE REASSESS COMMENT FT1
unable to straight cath pt, attempted by two RN. Pt states she can pee on bed pan, bed pan placed. Educated to press call bell when finished. No further RN intervention needed.

## 2023-02-08 NOTE — H&P ADULT - NSVTERISKASSESS_GEN_ALL_CORE FT
1/27/21  Azael Potter  2006    FLU/COVID-19 CLINIC EVALUATION    HPI SYMPTOMS:    Employer: [employer]    [x] Fevers  [] Chills  [] Cough  [] Coughing up blood  [] Chest Congestion  [] Nasal Congestion  [] Feeling short of breath  [] Sometimes  [] Frequently  [] All the time  [] Chest pain  [x] Headaches  [x]Tolerable  [] Severe  [] Sore throat  [] Muscle aches  [x] Nausea  [x] Vomiting  []Unable to keep fluids down  [x] Diarrhea  []Severe    [] OTHER SYMPTOMS:      Symptom Duration:   [] 1  [x] 2   [] 3   [] 4    [] 5   [] 6   [] 7   [] 8   [] 9   [] 10   [] 11   [] 12   [] 13   [] 14   [] Longer than 14 days    Symptom course:   [] Worsening     [x] Stable     [] Improving    RISK FACTORS:    [] Pregnant or possibly pregnant  [] Age over 61  [] Diabetes  [] Heart disease  [x] Asthma  [] COPD/Other chronic lung diseases  [] Active Cancer  [] On Chemotherapy  [] Taking oral steroids  [] History Lymphoma/Leukemia  [] Close contact with a lab confirmed COVID-19 patient within 14 days of symptom onset  [] History of travel from affected geographical areas within 14 days of symptom onset       VITALS:  There were no vitals filed for this visit. TESTS:    POCT FLU:  [] Positive     []Negative    ASSESSMENT:    [] Flu  [] Possible COVID-19  [] Strep    PLAN:    [] Discharge home with written instructions for:  [] Flu management  [] Possible COVID-19 infection with self-quarantine and management of symptoms  [] Follow-up with primary care physician or emergency department if worsens  [] Evaluation per physician/NP/PA in clinic  [] Sent to ER       An  electronic signature was used to authenticate this note.      --Keturah Sharp LPN on 2/05/3516 at 5:75 PM Medical Assessment Completed on: 08-Feb-2023 15:03

## 2023-02-08 NOTE — ED PROVIDER NOTE - CLINICAL SUMMARY MEDICAL DECISION MAKING FREE TEXT BOX
Decreased po intake and weakness.  could be worsening dementia.  will look for reversible cause of symptoms like UTI, electrolyte abnormality.  will reassess.

## 2023-02-08 NOTE — ED PROVIDER NOTE - NS ED ATTENDING STATEMENT MOD
This was a shared visit with the ALETHA. I reviewed and verified the documentation and independently performed the documented:

## 2023-02-08 NOTE — H&P ADULT - HISTORY OF PRESENT ILLNESS
89 yo female with pmh dementia, HTN, on [id number] daily here with increased generalized weakness and decreased PO since yesterday. Pts son at bedside providing history and translation. States pt usually ambulatory with walker at baseline and over the past 48 hours has been having worsening weakness. Per son, pt has been complaining of some mild R hip pain but denies trauma or injury. No fevers, chills, nausea, vomiting, abdominal pain, shortness of breath, urinary complaints, no cough.

## 2023-02-08 NOTE — ED ADULT NURSE REASSESSMENT NOTE - NS ED NURSE REASSESS COMMENT FT1
Spoke to Dior on [facility] to give report, states RN is with a pt, relayed message to have receiving RN Denia call ED back for report.

## 2023-02-08 NOTE — ED PROVIDER NOTE - PHYSICAL EXAMINATION
A&Ox2, NAD  Lungs CTAB. No w/r/r  Cardiac +S1S2, RRR, No m/r/g.   Abd soft, NT/ND, +BS, no rebound or guarding.   hips stable, no bony ttp or deformity.   Extremities: cap refill <2, pulses in distal extremities 4+, no edema.   Skin without rash.   No focal Deficits

## 2023-02-09 LAB
ANION GAP SERPL CALC-SCNC: 9 MMOL/L — SIGNIFICANT CHANGE UP (ref 5–17)
BUN SERPL-MCNC: 14 MG/DL — SIGNIFICANT CHANGE UP (ref 7–23)
CALCIUM SERPL-MCNC: 9.3 MG/DL — SIGNIFICANT CHANGE UP (ref 8.4–10.5)
CHLORIDE SERPL-SCNC: 107 MMOL/L — SIGNIFICANT CHANGE UP (ref 96–108)
CO2 SERPL-SCNC: 26 MMOL/L — SIGNIFICANT CHANGE UP (ref 22–31)
CREAT SERPL-MCNC: 0.91 MG/DL — SIGNIFICANT CHANGE UP (ref 0.5–1.3)
EGFR: 60 ML/MIN/1.73M2 — SIGNIFICANT CHANGE UP
FOLATE SERPL-MCNC: >20 NG/ML — SIGNIFICANT CHANGE UP
GLUCOSE SERPL-MCNC: 102 MG/DL — HIGH (ref 70–99)
HCT VFR BLD CALC: 34.3 % — LOW (ref 34.5–45)
HGB BLD-MCNC: 11.1 G/DL — LOW (ref 11.5–15.5)
MAGNESIUM SERPL-MCNC: 1.9 MG/DL — SIGNIFICANT CHANGE UP (ref 1.6–2.6)
MCHC RBC-ENTMCNC: 30.9 PG — SIGNIFICANT CHANGE UP (ref 27–34)
MCHC RBC-ENTMCNC: 32.4 GM/DL — SIGNIFICANT CHANGE UP (ref 32–36)
MCV RBC AUTO: 95.5 FL — SIGNIFICANT CHANGE UP (ref 80–100)
NRBC # BLD: 0 /100 WBCS — SIGNIFICANT CHANGE UP (ref 0–0)
PHOSPHATE SERPL-MCNC: 2.8 MG/DL — SIGNIFICANT CHANGE UP (ref 2.5–4.5)
PLATELET # BLD AUTO: 223 K/UL — SIGNIFICANT CHANGE UP (ref 150–400)
POTASSIUM SERPL-MCNC: 3.9 MMOL/L — SIGNIFICANT CHANGE UP (ref 3.5–5.3)
POTASSIUM SERPL-SCNC: 3.9 MMOL/L — SIGNIFICANT CHANGE UP (ref 3.5–5.3)
PREALB SERPL-MCNC: 23 MG/DL — SIGNIFICANT CHANGE UP (ref 20–40)
RBC # BLD: 3.59 M/UL — LOW (ref 3.8–5.2)
RBC # FLD: 14.5 % — SIGNIFICANT CHANGE UP (ref 10.3–14.5)
SODIUM SERPL-SCNC: 142 MMOL/L — SIGNIFICANT CHANGE UP (ref 135–145)
TSH SERPL-MCNC: 1.02 UIU/ML — SIGNIFICANT CHANGE UP (ref 0.27–4.2)
VIT B12 SERPL-MCNC: 707 PG/ML — SIGNIFICANT CHANGE UP (ref 232–1245)
WBC # BLD: 3.66 K/UL — LOW (ref 3.8–10.5)
WBC # FLD AUTO: 3.66 K/UL — LOW (ref 3.8–10.5)

## 2023-02-09 RX ORDER — PROPRANOLOL HCL 160 MG
1 CAPSULE, EXTENDED RELEASE 24HR ORAL
Qty: 0 | Refills: 0 | DISCHARGE

## 2023-02-09 RX ORDER — ATORVASTATIN CALCIUM 80 MG/1
40 TABLET, FILM COATED ORAL AT BEDTIME
Refills: 0 | Status: DISCONTINUED | OUTPATIENT
Start: 2023-02-09 | End: 2023-02-13

## 2023-02-09 RX ORDER — LOSARTAN POTASSIUM 100 MG/1
100 TABLET, FILM COATED ORAL DAILY
Refills: 0 | Status: DISCONTINUED | OUTPATIENT
Start: 2023-02-09 | End: 2023-02-13

## 2023-02-09 RX ORDER — TRAMADOL HYDROCHLORIDE 50 MG/1
50 TABLET ORAL EVERY 6 HOURS
Refills: 0 | Status: DISCONTINUED | OUTPATIENT
Start: 2023-02-09 | End: 2023-02-13

## 2023-02-09 RX ORDER — AMLODIPINE BESYLATE 2.5 MG/1
5 TABLET ORAL DAILY
Refills: 0 | Status: DISCONTINUED | OUTPATIENT
Start: 2023-02-09 | End: 2023-02-13

## 2023-02-09 RX ORDER — CELECOXIB 200 MG/1
200 CAPSULE ORAL DAILY
Refills: 0 | Status: DISCONTINUED | OUTPATIENT
Start: 2023-02-09 | End: 2023-02-13

## 2023-02-09 RX ORDER — GABAPENTIN 400 MG/1
100 CAPSULE ORAL
Refills: 0 | Status: DISCONTINUED | OUTPATIENT
Start: 2023-02-09 | End: 2023-02-13

## 2023-02-09 RX ORDER — MEMANTINE HYDROCHLORIDE 10 MG/1
5 TABLET ORAL
Refills: 0 | Status: DISCONTINUED | OUTPATIENT
Start: 2023-02-09 | End: 2023-02-13

## 2023-02-09 RX ORDER — OXYBUTYNIN CHLORIDE 5 MG
5 TABLET ORAL DAILY
Refills: 0 | Status: DISCONTINUED | OUTPATIENT
Start: 2023-02-09 | End: 2023-02-13

## 2023-02-09 RX ORDER — LIDOCAINE 4 G/100G
1 CREAM TOPICAL DAILY
Refills: 0 | Status: DISCONTINUED | OUTPATIENT
Start: 2023-02-09 | End: 2023-02-13

## 2023-02-09 RX ADMIN — LOSARTAN POTASSIUM 100 MILLIGRAM(S): 100 TABLET, FILM COATED ORAL at 13:16

## 2023-02-09 RX ADMIN — CELECOXIB 200 MILLIGRAM(S): 200 CAPSULE ORAL at 15:29

## 2023-02-09 RX ADMIN — AMLODIPINE BESYLATE 5 MILLIGRAM(S): 2.5 TABLET ORAL at 13:17

## 2023-02-09 RX ADMIN — Medication 5 MILLIGRAM(S): at 13:16

## 2023-02-09 RX ADMIN — GABAPENTIN 100 MILLIGRAM(S): 400 CAPSULE ORAL at 23:01

## 2023-02-09 RX ADMIN — HEPARIN SODIUM 5000 UNIT(S): 5000 INJECTION INTRAVENOUS; SUBCUTANEOUS at 23:02

## 2023-02-09 RX ADMIN — HEPARIN SODIUM 5000 UNIT(S): 5000 INJECTION INTRAVENOUS; SUBCUTANEOUS at 13:18

## 2023-02-09 RX ADMIN — MEMANTINE HYDROCHLORIDE 5 MILLIGRAM(S): 10 TABLET ORAL at 17:06

## 2023-02-09 RX ADMIN — GABAPENTIN 100 MILLIGRAM(S): 400 CAPSULE ORAL at 13:15

## 2023-02-09 RX ADMIN — CELECOXIB 200 MILLIGRAM(S): 200 CAPSULE ORAL at 16:43

## 2023-02-09 RX ADMIN — LIDOCAINE 1 PATCH: 4 CREAM TOPICAL at 13:18

## 2023-02-09 RX ADMIN — Medication 1 TABLET(S): at 23:01

## 2023-02-09 RX ADMIN — LIDOCAINE 1 PATCH: 4 CREAM TOPICAL at 19:25

## 2023-02-09 RX ADMIN — ATORVASTATIN CALCIUM 40 MILLIGRAM(S): 80 TABLET, FILM COATED ORAL at 23:01

## 2023-02-09 RX ADMIN — HEPARIN SODIUM 5000 UNIT(S): 5000 INJECTION INTRAVENOUS; SUBCUTANEOUS at 06:15

## 2023-02-09 RX ADMIN — Medication 81 MILLIGRAM(S): at 13:17

## 2023-02-09 RX ADMIN — Medication 1 TABLET(S): at 13:16

## 2023-02-09 NOTE — SWALLOW BEDSIDE ASSESSMENT ADULT - SLP PERTINENT HISTORY OF CURRENT PROBLEM
88 yo female with pmh dementia, HTN, on [id number] daily here with increased generalized weakness and decreased PO since yesterday. Pts son at bedside providing history and translation. States pt usually ambulatory with walker at baseline and over the past 48 hours has been having worsening weakness. Per son, pt has been complaining of some mild R hip pain but denies trauma or injury. No fevers, chills, nausea, vomiting, abdominal pain, shortness of breath, urinary complaints, no cough. UA is normal.

## 2023-02-09 NOTE — PHYSICAL THERAPY INITIAL EVALUATION ADULT - ADDITIONAL COMMENTS
Pt lives with her family in a apt with +elevator. Pt used a RW for ambulation PTA. Pt's son assisted pt with ADLs and functional mobility PTA.

## 2023-02-09 NOTE — SWALLOW BEDSIDE ASSESSMENT ADULT - SLP GENERAL OBSERVATIONS
Pt encountered upright in bed, on room air, Aox2. Latvian  utilized. Vocal quality WFL. RN endorsed pt has been tolerating regular diet and medications whole with water.

## 2023-02-09 NOTE — PHYSICAL THERAPY INITIAL EVALUATION ADULT - PERTINENT HX OF CURRENT PROBLEM, REHAB EVAL
Pt is a 89 yo female with pmh dementia, HTN, on [id number] daily here with increased generalized weakness and decreased PO since yesterday. Pts son at bedside providing history and translation. States pt usually ambulatory with walker at baseline and over the past 48 hours has been having worsening weakness. Per son, pt has been complaining of some mild R hip pain but denies trauma or injury. (-) CXR 2/8/23. R hip with pelvis x-ray 2/8/23: No acute displaced fracture. Unchanged small chronic nonunited cortical avulsion fracture versus heterotopic ossification at the ischial origin of right common hamstring tendon.

## 2023-02-09 NOTE — PATIENT PROFILE ADULT - FALL HARM RISK - HARM RISK INTERVENTIONS

## 2023-02-09 NOTE — SWALLOW BEDSIDE ASSESSMENT ADULT - SWALLOW EVAL: DIAGNOSIS
Pt is an 88 y/o female PMHx of dementia a/w decreased PO intake. Pt presents with an oropharyngeal swallow sequence that appears WFL to tolerate a regular texture diet with no overt s/s of laryngeal penetration or aspiration.

## 2023-02-09 NOTE — PATIENT PROFILE ADULT - STATED REASON FOR ADMISSION
Impression: Type 2 diabetes mellitus w/o complication: P20.7. Plan: No diabetic retinopathy today. Discussed importance of closely monitoring and controlling glucose to minimize risks of progression of disease. Patient instructed to notify clinic immediately if changes to vision are noted. Inability to walk, move and does not want to eat anything.

## 2023-02-09 NOTE — SWALLOW BEDSIDE ASSESSMENT ADULT - ASR SWALLOW ASPIRATION MONITOR
Monitor for s/s aspiration/laryngeal penetration. If noted:  D/C p.o. intake, provide non-oral nutrition/hydration/meds, and contact this service @ [phone]/change of breathing pattern/cough/gurgly voice/fever/pneumonia/throat clearing/upper respiratory infection

## 2023-02-09 NOTE — SWALLOW BEDSIDE ASSESSMENT ADULT - COMMENTS
Physical therapy recommendations for KOKI.    2/8 CXR IMPRESSION: Clear lungs.    SWALLOW HISTORY: No reports in SCM or in PACS prior to this admission.

## 2023-02-09 NOTE — PATIENT PROFILE ADULT - NSPRESCRUSEDDRG_GEN_A_NUR
[FreeTextEntry1] : 48M s/p R foot TMA & ADDISON (DOS 6/11/21)\par - pt seen and evaluated\par - R TMA site stable; additional sutures & staples removed from TMA site with some areas left intact, no dehiscence, removed and central TMA sutures\par - L wound stable \par - excisional debridement carried out on L foot w/ #15 blade down to subQ, but not beyond suBQ, excising out loosely adhered fibrin \par - Patient to have R foot molded for TMA filler and carbon fiber plate today. He is ambulatory and will need for lifetime\par - aquacel to R foot central wound w/ steri strips applied to lateral and medial, santyl to L foot \par - Discussed with patient to remain in CAM boot however patient refuses to remain in CAM boot & is adamant to remain in the surgical shoe - advised to obtain physical therapy to strengthen Achilles prior to getting off CAM boot but he refuses CAM boot.  Explained the potential for heel wound.\par - RTC 1 wk in office\par  No

## 2023-02-09 NOTE — PROGRESS NOTE ADULT - SUBJECTIVE AND OBJECTIVE BOX
DATE OF SERVICE: 23 @ 11:46    Patient is a 89y old  Female who presents with a chief complaint of FTT (2023 15:00)      SUBJECTIVE / OVERNIGHT EVENTS:  No chest pain. No shortness of breath. No complaints. No events overnight.     MEDICATIONS  (STANDING):  amLODIPine   Tablet 5 milliGRAM(s) Oral daily  aspirin enteric coated 81 milliGRAM(s) Oral daily  atorvastatin 40 milliGRAM(s) Oral at bedtime  calcium carbonate 1250 mG  + Vitamin D (OsCal 500 + D) 1 Tablet(s) Oral two times a day  celecoxib 200 milliGRAM(s) Oral daily  gabapentin 100 milliGRAM(s) Oral two times a day  heparin   Injectable 5000 Unit(s) SubCutaneous every 8 hours  lidocaine   4% Patch 1 Patch Transdermal daily  losartan 100 milliGRAM(s) Oral daily  memantine 5 milliGRAM(s) Oral two times a day  oxybutynin 5 milliGRAM(s) Oral daily  propranolol 20 milliGRAM(s) Oral three times a day    MEDICATIONS  (PRN):  acetaminophen     Tablet .. 650 milliGRAM(s) Oral every 6 hours PRN Temp greater or equal to 38C (100.4F), Mild Pain (1 - 3)  traMADol 50 milliGRAM(s) Oral every 6 hours PRN Severe Pain (7 - 10)      Vital Signs Last 24 Hrs  T(C): 36.6 (2023 04:15), Max: 37 (2023 16:30)  T(F): 97.8 (2023 04:15), Max: 98.6 (2023 16:30)  HR: 61 (2023 11:03) (54 - 61)  BP: 130/79 (2023 11:03) (130/79 - 177/91)  BP(mean): 88 (2023 17:54) (88 - 102)  RR: 18 (2023 04:15) (16 - 19)  SpO2: 98% (2023 11:03) (96% - 100%)    Parameters below as of 2023 11:03  Patient On (Oxygen Delivery Method): room air      CAPILLARY BLOOD GLUCOSE        I&O's Summary      PHYSICAL EXAM:  GENERAL: NAD, well-developed  HEAD:  Atraumatic, Normocephalic  EYES: EOMI, PERRLA, conjunctiva and sclera clear  NECK: Supple, No JVD  CHEST/LUNG: Clear to auscultation bilaterally; No wheeze  HEART: Regular rate and rhythm; No murmurs, rubs, or gallops  ABDOMEN: Soft, Nontender, Nondistended; Bowel sounds present  EXTREMITIES:  2+ Peripheral Pulses, No clubbing, cyanosis, or edema  PSYCH: AAOx3  NEUROLOGY: non-focal  SKIN: No rashes or lesions    LABS:                        11.1   3.66  )-----------( 223      ( 2023 06:58 )             34.3     02-    142  |  107  |  14  ----------------------------<  102<H>  3.9   |  26  |  0.91    Ca    9.3      2023 06:56  Phos  2.8     02-09  Mg     1.9     -    TPro  7.3  /  Alb  3.8  /  TBili  0.5  /  DBili  x   /  AST  53<H>  /  ALT  24  /  AlkPhos  73  02-08    PT/INR - ( 2023 11:40 )   PT: 10.6 sec;   INR: 0.92 ratio         PTT - ( 2023 11:40 )  PTT:29.8 sec      Urinalysis Basic - ( 2023 19:41 )    Color: Light Yellow / Appearance: Slightly Turbid / S.018 / pH: x  Gluc: x / Ketone: Negative  / Bili: Negative / Urobili: Negative   Blood: x / Protein: Trace / Nitrite: Positive   Leuk Esterase: Small / RBC: 1 /hpf / WBC 1 /HPF   Sq Epi: x / Non Sq Epi: 1 /hpf / Bacteria: Negative        RADIOLOGY & ADDITIONAL TESTS:    Imaging Personally Reviewed:    Consultant(s) Notes Reviewed:      Care Discussed with Consultants/Other Providers:

## 2023-02-09 NOTE — PHYSICAL THERAPY INITIAL EVALUATION ADULT - STRENGTHENING, PT EVAL
GOAL: Patient will improve bilateral UE/LE strength to 5/5, for increased limb stability, and to improve gait in 4 weeks.

## 2023-02-10 RX ORDER — CEFUROXIME AXETIL 250 MG
250 TABLET ORAL EVERY 12 HOURS
Refills: 0 | Status: COMPLETED | OUTPATIENT
Start: 2023-02-10 | End: 2023-02-13

## 2023-02-10 RX ADMIN — HEPARIN SODIUM 5000 UNIT(S): 5000 INJECTION INTRAVENOUS; SUBCUTANEOUS at 21:58

## 2023-02-10 RX ADMIN — CELECOXIB 200 MILLIGRAM(S): 200 CAPSULE ORAL at 12:54

## 2023-02-10 RX ADMIN — Medication 81 MILLIGRAM(S): at 12:53

## 2023-02-10 RX ADMIN — GABAPENTIN 100 MILLIGRAM(S): 400 CAPSULE ORAL at 06:21

## 2023-02-10 RX ADMIN — Medication 1 TABLET(S): at 16:42

## 2023-02-10 RX ADMIN — Medication 1 TABLET(S): at 06:20

## 2023-02-10 RX ADMIN — ATORVASTATIN CALCIUM 40 MILLIGRAM(S): 80 TABLET, FILM COATED ORAL at 21:58

## 2023-02-10 RX ADMIN — HEPARIN SODIUM 5000 UNIT(S): 5000 INJECTION INTRAVENOUS; SUBCUTANEOUS at 06:21

## 2023-02-10 RX ADMIN — LOSARTAN POTASSIUM 100 MILLIGRAM(S): 100 TABLET, FILM COATED ORAL at 06:21

## 2023-02-10 RX ADMIN — HEPARIN SODIUM 5000 UNIT(S): 5000 INJECTION INTRAVENOUS; SUBCUTANEOUS at 15:02

## 2023-02-10 RX ADMIN — AMLODIPINE BESYLATE 5 MILLIGRAM(S): 2.5 TABLET ORAL at 06:20

## 2023-02-10 RX ADMIN — Medication 5 MILLIGRAM(S): at 12:53

## 2023-02-10 RX ADMIN — LIDOCAINE 1 PATCH: 4 CREAM TOPICAL at 01:05

## 2023-02-10 RX ADMIN — MEMANTINE HYDROCHLORIDE 5 MILLIGRAM(S): 10 TABLET ORAL at 16:42

## 2023-02-10 RX ADMIN — MEMANTINE HYDROCHLORIDE 5 MILLIGRAM(S): 10 TABLET ORAL at 06:20

## 2023-02-10 RX ADMIN — GABAPENTIN 100 MILLIGRAM(S): 400 CAPSULE ORAL at 16:43

## 2023-02-10 RX ADMIN — Medication 250 MILLIGRAM(S): at 16:43

## 2023-02-10 NOTE — DIETITIAN INITIAL EVALUATION ADULT - PERTINENT MEDS FT
MEDICATIONS  (STANDING):  amLODIPine   Tablet 5 milliGRAM(s) Oral daily  aspirin enteric coated 81 milliGRAM(s) Oral daily  atorvastatin 40 milliGRAM(s) Oral at bedtime  calcium carbonate 1250 mG  + Vitamin D (OsCal 500 + D) 1 Tablet(s) Oral two times a day  cefuroxime   Tablet 250 milliGRAM(s) Oral every 12 hours  celecoxib 200 milliGRAM(s) Oral daily  gabapentin 100 milliGRAM(s) Oral two times a day  heparin   Injectable 5000 Unit(s) SubCutaneous every 8 hours  lidocaine   4% Patch 1 Patch Transdermal daily  losartan 100 milliGRAM(s) Oral daily  memantine 5 milliGRAM(s) Oral two times a day  oxybutynin 5 milliGRAM(s) Oral daily  propranolol 20 milliGRAM(s) Oral three times a day    MEDICATIONS  (PRN):  acetaminophen     Tablet .. 650 milliGRAM(s) Oral every 6 hours PRN Temp greater or equal to 38C (100.4F), Mild Pain (1 - 3)  traMADol 50 milliGRAM(s) Oral every 6 hours PRN Severe Pain (7 - 10)

## 2023-02-10 NOTE — DIETITIAN INITIAL EVALUATION ADULT - ORAL INTAKE PTA/DIET HISTORY
Pt was eating well with no changes in appetite; prefers softer foods. Confirms no known food allergies.

## 2023-02-10 NOTE — DIETITIAN INITIAL EVALUATION ADULT - REASON FOR ADMISSION
Per Chart: Pt is a 89 yo female with pmh dementia, HTN, on [id number] daily here with increased generalized weakness and decreased PO since yesterday. Pts son at bedside providing history and translation. States pt usually ambulatory with walker at baseline and over the past 48 hours has been having worsening weakness. Per son, pt has been complaining of some mild R hip pain but denies trauma or injury. No fevers, chills, nausea, vomiting, abdominal pain, shortness of breath, urinary complaints, no cough.

## 2023-02-10 NOTE — DIETITIAN INITIAL EVALUATION ADULT - OTHER INFO
Wt Hx:   Dosing wt 49.9 kG/110 lbs. RD obtained wt: ~108 lbs  UBW ~110-120 lbs; denies any changes in wt PTA. Ht: 61 inches   IBW: 105 lbs    IBW%: 105%  Wt Hx per HIE (lbs): 121 lbs (6/30/22)  Wt from 6/30/22 vs dosing wt indicates 10% wt loss x8 months (not clonically significant)    Nutrition-Related Concerns:   - Seen by SLP 2/9 with recommendation, "regular solids/thin liquids"

## 2023-02-10 NOTE — DIETITIAN INITIAL EVALUATION ADULT - EDUCATION DIETARY MODIFICATIONS
RD reinforced need for adequate protein-energy intake to family member. Amenable to pt receiving supplement. Pt made aware RD to remain available./(2) meets goals/outcomes/verbalization

## 2023-02-10 NOTE — PROGRESS NOTE ADULT - SUBJECTIVE AND OBJECTIVE BOX
DATE OF SERVICE: 02-10-23 @ 12:10    Patient is a 89y old  Female who presents with a chief complaint of FTT (2023 11:46)      SUBJECTIVE / OVERNIGHT EVENTS:  No chest pain. No shortness of breath. No complaints. No events overnight.     MEDICATIONS  (STANDING):  amLODIPine   Tablet 5 milliGRAM(s) Oral daily  aspirin enteric coated 81 milliGRAM(s) Oral daily  atorvastatin 40 milliGRAM(s) Oral at bedtime  calcium carbonate 1250 mG  + Vitamin D (OsCal 500 + D) 1 Tablet(s) Oral two times a day  cefuroxime   Tablet 250 milliGRAM(s) Oral every 12 hours  celecoxib 200 milliGRAM(s) Oral daily  gabapentin 100 milliGRAM(s) Oral two times a day  heparin   Injectable 5000 Unit(s) SubCutaneous every 8 hours  lidocaine   4% Patch 1 Patch Transdermal daily  losartan 100 milliGRAM(s) Oral daily  memantine 5 milliGRAM(s) Oral two times a day  oxybutynin 5 milliGRAM(s) Oral daily  propranolol 20 milliGRAM(s) Oral three times a day    MEDICATIONS  (PRN):  acetaminophen     Tablet .. 650 milliGRAM(s) Oral every 6 hours PRN Temp greater or equal to 38C (100.4F), Mild Pain (1 - 3)  traMADol 50 milliGRAM(s) Oral every 6 hours PRN Severe Pain (7 - 10)      Vital Signs Last 24 Hrs  T(C): 36.4 (10 Feb 2023 04:38), Max: 37.1 (2023 20:46)  T(F): 97.5 (10 Feb 2023 04:38), Max: 98.8 (2023 20:46)  HR: 54 (10 Feb 2023 04:38) (54 - 64)  BP: 150/63 (10 Feb 2023 04:38) (132/83 - 150/63)  BP(mean): --  RR: 18 (10 Feb 2023 04:38) (18 - 18)  SpO2: 98% (10 Feb 2023 04:38) (96% - 98%)    Parameters below as of 10 Feb 2023 04:38  Patient On (Oxygen Delivery Method): room air      CAPILLARY BLOOD GLUCOSE        I&O's Summary    2023 07:01  -  10 Feb 2023 07:00  --------------------------------------------------------  IN: 600 mL / OUT: 2 mL / NET: 598 mL        PHYSICAL EXAM:  GENERAL: NAD, well-developed  HEAD:  Atraumatic, Normocephalic  EYES: EOMI, PERRLA, conjunctiva and sclera clear  NECK: Supple, No JVD  CHEST/LUNG: Clear to auscultation bilaterally; No wheeze  HEART: Regular rate and rhythm; No murmurs, rubs, or gallops  ABDOMEN: Soft, Nontender, Nondistended; Bowel sounds present  EXTREMITIES:  2+ Peripheral Pulses, No clubbing, cyanosis, or edema  NEUROLOGY: non-focal  SKIN: No rashes or lesions    LABS:                        11.1   3.66  )-----------( 223      ( 2023 06:58 )             34.3         142  |  107  |  14  ----------------------------<  102<H>  3.9   |  26  |  0.91    Ca    9.3      2023 06:56  Phos  2.8       Mg     1.9         Culture - Urine (23 @ 19:41)   Specimen Source: Clean Catch Clean Catch (Midstream)   Culture Results:   >100,000 CFU/ml Escherichia coli       Historical Values  Culture - Urine (23 @ 19:41)   Specimen Source: Clean Catch Clean Catch (Midstream)   Culture Results:   >100,000 CFU/ml Escherichia coli         Urinalysis Basic - ( 2023 19:41 )    Color: Light Yellow / Appearance: Slightly Turbid / S.018 / pH: x  Gluc: x / Ketone: Negative  / Bili: Negative / Urobili: Negative   Blood: x / Protein: Trace / Nitrite: Positive   Leuk Esterase: Small / RBC: 1 /hpf / WBC 1 /HPF   Sq Epi: x / Non Sq Epi: 1 /hpf / Bacteria: Negative        RADIOLOGY & ADDITIONAL TESTS:    Imaging Personally Reviewed:    Consultant(s) Notes Reviewed:      Care Discussed with Consultants/Other Providers:

## 2023-02-10 NOTE — DIETITIAN INITIAL EVALUATION ADULT - ADD RECOMMEND
Can you please let patient know that we forgot to discuss A1c. As it is so well controlled, I would recommend stopping the januvia. He should continue metformin.     Also I placed a referral to Derm for the skin lesions on his chest. Thanks!     Continue to trend labs, weight, skin integrity, and intake.

## 2023-02-10 NOTE — DIETITIAN INITIAL EVALUATION ADULT - REASON INDICATOR FOR ASSESSMENT
Consult for nutrition assessment and education  Source: Medical record and pt's family member at bedside (pt with dementia and asleep)

## 2023-02-11 ENCOUNTER — TRANSCRIPTION ENCOUNTER (OUTPATIENT)
Age: 88
End: 2023-02-11

## 2023-02-11 RX ADMIN — ATORVASTATIN CALCIUM 40 MILLIGRAM(S): 80 TABLET, FILM COATED ORAL at 22:43

## 2023-02-11 RX ADMIN — Medication 1 TABLET(S): at 18:11

## 2023-02-11 RX ADMIN — HEPARIN SODIUM 5000 UNIT(S): 5000 INJECTION INTRAVENOUS; SUBCUTANEOUS at 22:44

## 2023-02-11 RX ADMIN — HEPARIN SODIUM 5000 UNIT(S): 5000 INJECTION INTRAVENOUS; SUBCUTANEOUS at 14:06

## 2023-02-11 RX ADMIN — GABAPENTIN 100 MILLIGRAM(S): 400 CAPSULE ORAL at 05:55

## 2023-02-11 RX ADMIN — CELECOXIB 200 MILLIGRAM(S): 200 CAPSULE ORAL at 11:37

## 2023-02-11 RX ADMIN — Medication 5 MILLIGRAM(S): at 09:24

## 2023-02-11 RX ADMIN — Medication 250 MILLIGRAM(S): at 18:11

## 2023-02-11 RX ADMIN — Medication 250 MILLIGRAM(S): at 05:56

## 2023-02-11 RX ADMIN — HEPARIN SODIUM 5000 UNIT(S): 5000 INJECTION INTRAVENOUS; SUBCUTANEOUS at 05:56

## 2023-02-11 RX ADMIN — Medication 81 MILLIGRAM(S): at 11:37

## 2023-02-11 RX ADMIN — LIDOCAINE 1 PATCH: 4 CREAM TOPICAL at 09:25

## 2023-02-11 RX ADMIN — LOSARTAN POTASSIUM 100 MILLIGRAM(S): 100 TABLET, FILM COATED ORAL at 05:56

## 2023-02-11 RX ADMIN — Medication 1 TABLET(S): at 05:56

## 2023-02-11 RX ADMIN — CELECOXIB 200 MILLIGRAM(S): 200 CAPSULE ORAL at 12:34

## 2023-02-11 RX ADMIN — AMLODIPINE BESYLATE 5 MILLIGRAM(S): 2.5 TABLET ORAL at 05:56

## 2023-02-11 RX ADMIN — MEMANTINE HYDROCHLORIDE 5 MILLIGRAM(S): 10 TABLET ORAL at 05:56

## 2023-02-11 RX ADMIN — MEMANTINE HYDROCHLORIDE 5 MILLIGRAM(S): 10 TABLET ORAL at 18:11

## 2023-02-11 RX ADMIN — LIDOCAINE 1 PATCH: 4 CREAM TOPICAL at 18:43

## 2023-02-11 RX ADMIN — GABAPENTIN 100 MILLIGRAM(S): 400 CAPSULE ORAL at 18:10

## 2023-02-11 NOTE — DISCHARGE NOTE PROVIDER - HOSPITAL COURSE
poss UTI  - pt is asymptomatic  - urine culture is positive  - will treat with ceftin x 3 days    FTT  - nutrition consult  - PT eval    Dementia  - b12 , folate, tsh normal    HTN  - c/w home meds    dvt px   sq heparin    dispo  - d/c planning [facility]    Discharge initiated. poss UTI  - pt is asymptomatic  - urine culture is positive  - will treat with ceftin x 3 days    FTT  - nutrition consult  - PT eval    Dementia  - b12 , folate, tsh normal    HTN  - c/w home meds    Discharge to [facility]    Discharge/dispo/med rec discussed with attending Dr. Fraga. Patient is medically cleared for discharge with outpatient follow up.

## 2023-02-11 NOTE — DISCHARGE NOTE PROVIDER - NSDCQMSTROKE_NEU_ALL_CORE
No
Yes
H Plasty Text: Given the location of the defect, shape of the defect and the proximity to free margins a H-plasty was deemed most appropriate for repair.  Using a sterile surgical marker, the appropriate advancement arms of the H-plasty were drawn incorporating the defect and placing the expected incisions within the relaxed skin tension lines where possible. The area thus outlined was incised deep to adipose tissue with a #15 scalpel blade. The skin margins were undermined to an appropriate distance in all directions utilizing iris scissors.  The opposing advancement arms were then advanced into place in opposite direction and anchored with interrupted buried subcutaneous sutures.

## 2023-02-11 NOTE — PROGRESS NOTE ADULT - SUBJECTIVE AND OBJECTIVE BOX
DATE OF SERVICE: 02-11-23 @ 10:31    Patient is a 89y old  Female who presents with a chief complaint of FTT (11 Feb 2023 05:16)      SUBJECTIVE / OVERNIGHT EVENTS:  No chest pain. No shortness of breath. No complaints. No events overnight.     MEDICATIONS  (STANDING):  amLODIPine   Tablet 5 milliGRAM(s) Oral daily  aspirin enteric coated 81 milliGRAM(s) Oral daily  atorvastatin 40 milliGRAM(s) Oral at bedtime  calcium carbonate 1250 mG  + Vitamin D (OsCal 500 + D) 1 Tablet(s) Oral two times a day  cefuroxime   Tablet 250 milliGRAM(s) Oral every 12 hours  celecoxib 200 milliGRAM(s) Oral daily  gabapentin 100 milliGRAM(s) Oral two times a day  heparin   Injectable 5000 Unit(s) SubCutaneous every 8 hours  lidocaine   4% Patch 1 Patch Transdermal daily  losartan 100 milliGRAM(s) Oral daily  memantine 5 milliGRAM(s) Oral two times a day  oxybutynin 5 milliGRAM(s) Oral daily  propranolol 20 milliGRAM(s) Oral three times a day    MEDICATIONS  (PRN):  acetaminophen     Tablet .. 650 milliGRAM(s) Oral every 6 hours PRN Temp greater or equal to 38C (100.4F), Mild Pain (1 - 3)  traMADol 50 milliGRAM(s) Oral every 6 hours PRN Severe Pain (7 - 10)      Vital Signs Last 24 Hrs  T(C): 36.7 (11 Feb 2023 04:30), Max: 36.9 (10 Feb 2023 20:53)  T(F): 98.1 (11 Feb 2023 04:30), Max: 98.4 (10 Feb 2023 20:53)  HR: 64 (11 Feb 2023 05:54) (50 - 89)  BP: 151/75 (11 Feb 2023 05:54) (117/83 - 151/75)  BP(mean): --  RR: 18 (11 Feb 2023 04:30) (18 - 18)  SpO2: 98% (11 Feb 2023 04:30) (96% - 99%)    Parameters below as of 11 Feb 2023 04:30  Patient On (Oxygen Delivery Method): room air      CAPILLARY BLOOD GLUCOSE        I&O's Summary    10 Feb 2023 07:01  - 11 Feb 2023 07:00  --------------------------------------------------------  IN: 480 mL / OUT: 0 mL / NET: 480 mL        PHYSICAL EXAM:  GENERAL: NAD, well-developed  HEAD:  Atraumatic, Normocephalic  EYES: EOMI, PERRLA, conjunctiva and sclera clear  NECK: Supple, No JVD  CHEST/LUNG: Clear to auscultation bilaterally; No wheeze  HEART: Regular rate and rhythm; No murmurs, rubs, or gallops  ABDOMEN: Soft, Nontender, Nondistended; Bowel sounds present  EXTREMITIES:  2+ Peripheral Pulses, No clubbing, cyanosis, or edema  PSYCH: AAOx3  NEUROLOGY: non-focal  SKIN: No rashes or lesions    LABS:                    RADIOLOGY & ADDITIONAL TESTS:    Imaging Personally Reviewed:    Consultant(s) Notes Reviewed:      Care Discussed with Consultants/Other Providers:

## 2023-02-11 NOTE — DISCHARGE NOTE PROVIDER - NSDCMRMEDTOKEN_GEN_ALL_CORE_FT
acetaminophen 500 mg oral tablet: 1 tab(s) orally every 6 hours  amLODIPine 5 mg oral tablet: 1 tab(s) orally once a day  aspirin 81 mg oral delayed release tablet: 1 tab(s) orally once a day  atorvastatin 40 mg oral tablet: 1 tab(s) orally once a day  cefuroxime 250 mg oral tablet: 1 tab(s) orally every 12 hours  celecoxib 200 mg oral capsule: 1 cap(s) orally once a day  gabapentin 100 mg oral capsule: 1 cap(s) orally 2 times a day  lidocaine 4% topical film: Apply topically to affected area once a day   losartan 100 mg oral tablet: 1 tab(s) orally once a day  memantine 5 mg oral tablet: 1 tab(s) orally 2 times a day  oxybutynin 5 mg oral tablet: 1 tab(s) orally once a day  Oysco 500 with D 500 mg-5 mcg (200 intl units) oral tablet: 1 tab(s) orally 2 times a day  propranolol 20 mg oral tablet: 1 tab(s) orally 3 times a day  traMADol 50 mg oral tablet: 1 tab(s) orally every 6 hours, As Needed   acetaminophen 325 mg oral tablet: 2 tab(s) orally every 6 hours, As needed, Temp greater or equal to 38C (100.4F), Mild Pain (1 - 3)  amLODIPine 5 mg oral tablet: 1 tab(s) orally once a day  aspirin 81 mg oral delayed release tablet: 1 tab(s) orally once a day  atorvastatin 40 mg oral tablet: 1 tab(s) orally once a day (at bedtime)  calcium-vitamin D 500 mg-5 mcg (200 intl units) oral tablet: 1 tab(s) orally 2 times a day  celecoxib 200 mg oral capsule: 1 cap(s) orally once a day  gabapentin 100 mg oral capsule: 1 cap(s) orally 2 times a day  lidocaine 4% topical film: Apply topically to affected area once a day  losartan 100 mg oral tablet: 1 tab(s) orally once a day  memantine 5 mg oral tablet: 1 tab(s) orally 2 times a day  oxybutynin 5 mg oral tablet: 1 tab(s) orally once a day  propranolol 20 mg oral tablet: 1 tab(s) orally 3 times a day  traMADol 50 mg oral tablet: 1 tab(s) orally every 6 hours, As needed, Severe Pain (7 - 10)

## 2023-02-11 NOTE — DISCHARGE NOTE PROVIDER - CARE PROVIDER_API CALL
Raissa Gerardo  Internal Medicine  [address] [place] [address]  Phone: [phone]  Fax: [phone]  Follow Up Time: 1 week

## 2023-02-12 RX ADMIN — LIDOCAINE 1 PATCH: 4 CREAM TOPICAL at 19:44

## 2023-02-12 RX ADMIN — Medication 81 MILLIGRAM(S): at 13:07

## 2023-02-12 RX ADMIN — CELECOXIB 200 MILLIGRAM(S): 200 CAPSULE ORAL at 15:04

## 2023-02-12 RX ADMIN — Medication 5 MILLIGRAM(S): at 13:07

## 2023-02-12 RX ADMIN — GABAPENTIN 100 MILLIGRAM(S): 400 CAPSULE ORAL at 17:31

## 2023-02-12 RX ADMIN — LOSARTAN POTASSIUM 100 MILLIGRAM(S): 100 TABLET, FILM COATED ORAL at 05:23

## 2023-02-12 RX ADMIN — ATORVASTATIN CALCIUM 40 MILLIGRAM(S): 80 TABLET, FILM COATED ORAL at 22:05

## 2023-02-12 RX ADMIN — MEMANTINE HYDROCHLORIDE 5 MILLIGRAM(S): 10 TABLET ORAL at 17:32

## 2023-02-12 RX ADMIN — HEPARIN SODIUM 5000 UNIT(S): 5000 INJECTION INTRAVENOUS; SUBCUTANEOUS at 13:08

## 2023-02-12 RX ADMIN — Medication 250 MILLIGRAM(S): at 17:31

## 2023-02-12 RX ADMIN — LIDOCAINE 1 PATCH: 4 CREAM TOPICAL at 13:14

## 2023-02-12 RX ADMIN — AMLODIPINE BESYLATE 5 MILLIGRAM(S): 2.5 TABLET ORAL at 05:22

## 2023-02-12 RX ADMIN — MEMANTINE HYDROCHLORIDE 5 MILLIGRAM(S): 10 TABLET ORAL at 05:25

## 2023-02-12 RX ADMIN — HEPARIN SODIUM 5000 UNIT(S): 5000 INJECTION INTRAVENOUS; SUBCUTANEOUS at 05:22

## 2023-02-12 RX ADMIN — Medication 1 TABLET(S): at 17:31

## 2023-02-12 RX ADMIN — CELECOXIB 200 MILLIGRAM(S): 200 CAPSULE ORAL at 13:07

## 2023-02-12 RX ADMIN — GABAPENTIN 100 MILLIGRAM(S): 400 CAPSULE ORAL at 05:23

## 2023-02-12 RX ADMIN — Medication 1 TABLET(S): at 05:23

## 2023-02-12 RX ADMIN — HEPARIN SODIUM 5000 UNIT(S): 5000 INJECTION INTRAVENOUS; SUBCUTANEOUS at 22:05

## 2023-02-12 RX ADMIN — Medication 250 MILLIGRAM(S): at 05:23

## 2023-02-12 NOTE — PROGRESS NOTE ADULT - SUBJECTIVE AND OBJECTIVE BOX
DATE OF SERVICE: 02-12-23 @ 13:15    Patient is a 89y old  Female who presents with a chief complaint of FTT (11 Feb 2023 10:31)      SUBJECTIVE / OVERNIGHT EVENTS:  No chest pain. No shortness of breath. No complaints. No events overnight.     MEDICATIONS  (STANDING):  amLODIPine   Tablet 5 milliGRAM(s) Oral daily  aspirin enteric coated 81 milliGRAM(s) Oral daily  atorvastatin 40 milliGRAM(s) Oral at bedtime  calcium carbonate 1250 mG  + Vitamin D (OsCal 500 + D) 1 Tablet(s) Oral two times a day  cefuroxime   Tablet 250 milliGRAM(s) Oral every 12 hours  celecoxib 200 milliGRAM(s) Oral daily  gabapentin 100 milliGRAM(s) Oral two times a day  heparin   Injectable 5000 Unit(s) SubCutaneous every 8 hours  lidocaine   4% Patch 1 Patch Transdermal daily  losartan 100 milliGRAM(s) Oral daily  memantine 5 milliGRAM(s) Oral two times a day  oxybutynin 5 milliGRAM(s) Oral daily  propranolol 20 milliGRAM(s) Oral three times a day    MEDICATIONS  (PRN):  acetaminophen     Tablet .. 650 milliGRAM(s) Oral every 6 hours PRN Temp greater or equal to 38C (100.4F), Mild Pain (1 - 3)  traMADol 50 milliGRAM(s) Oral every 6 hours PRN Severe Pain (7 - 10)      Vital Signs Last 24 Hrs  T(C): 36.7 (12 Feb 2023 12:14), Max: 37.1 (11 Feb 2023 14:21)  T(F): 98.1 (12 Feb 2023 12:14), Max: 98.8 (11 Feb 2023 14:21)  HR: 52 (12 Feb 2023 12:14) (52 - 56)  BP: 114/69 (12 Feb 2023 12:14) (99/65 - 135/70)  BP(mean): --  RR: 18 (12 Feb 2023 12:14) (18 - 18)  SpO2: 96% (12 Feb 2023 12:14) (95% - 98%)    Parameters below as of 12 Feb 2023 12:14  Patient On (Oxygen Delivery Method): room air      CAPILLARY BLOOD GLUCOSE        I&O's Summary      PHYSICAL EXAM:  GENERAL: NAD, well-developed  HEAD:  Atraumatic, Normocephalic  EYES: EOMI, PERRLA, conjunctiva and sclera clear  NECK: Supple, No JVD  CHEST/LUNG: Clear to auscultation bilaterally; No wheeze  HEART: Regular rate and rhythm; No murmurs, rubs, or gallops  ABDOMEN: Soft, Nontender, Nondistended; Bowel sounds present  EXTREMITIES:  2+ Peripheral Pulses, No clubbing, cyanosis, or edema  NEUROLOGY: non-focal  SKIN: No rashes or lesions    LABS:                    RADIOLOGY & ADDITIONAL TESTS:    Imaging Personally Reviewed:    Consultant(s) Notes Reviewed:      Care Discussed with Consultants/Other Providers:

## 2023-02-13 ENCOUNTER — TRANSCRIPTION ENCOUNTER (OUTPATIENT)
Age: 88
End: 2023-02-13

## 2023-02-13 VITALS
TEMPERATURE: 98 F | SYSTOLIC BLOOD PRESSURE: 124 MMHG | DIASTOLIC BLOOD PRESSURE: 69 MMHG | HEART RATE: 55 BPM | RESPIRATION RATE: 18 BRPM | OXYGEN SATURATION: 96 %

## 2023-02-13 LAB — SARS-COV-2 RNA SPEC QL NAA+PROBE: SIGNIFICANT CHANGE UP

## 2023-02-13 PROCEDURE — 36415 COLL VENOUS BLD VENIPUNCTURE: CPT

## 2023-02-13 PROCEDURE — 87186 SC STD MICRODIL/AGAR DIL: CPT

## 2023-02-13 PROCEDURE — 82607 VITAMIN B-12: CPT

## 2023-02-13 PROCEDURE — 81001 URINALYSIS AUTO W/SCOPE: CPT

## 2023-02-13 PROCEDURE — 84100 ASSAY OF PHOSPHORUS: CPT

## 2023-02-13 PROCEDURE — 80053 COMPREHEN METABOLIC PANEL: CPT

## 2023-02-13 PROCEDURE — 92610 EVALUATE SWALLOWING FUNCTION: CPT

## 2023-02-13 PROCEDURE — 87086 URINE CULTURE/COLONY COUNT: CPT

## 2023-02-13 PROCEDURE — 73502 X-RAY EXAM HIP UNI 2-3 VIEWS: CPT

## 2023-02-13 PROCEDURE — 83735 ASSAY OF MAGNESIUM: CPT

## 2023-02-13 PROCEDURE — 97161 PT EVAL LOW COMPLEX 20 MIN: CPT

## 2023-02-13 PROCEDURE — 99285 EMERGENCY DEPT VISIT HI MDM: CPT

## 2023-02-13 PROCEDURE — 80048 BASIC METABOLIC PNL TOTAL CA: CPT

## 2023-02-13 PROCEDURE — G0378: CPT

## 2023-02-13 PROCEDURE — 97116 GAIT TRAINING THERAPY: CPT

## 2023-02-13 PROCEDURE — 85610 PROTHROMBIN TIME: CPT

## 2023-02-13 PROCEDURE — 97110 THERAPEUTIC EXERCISES: CPT

## 2023-02-13 PROCEDURE — 87635 SARS-COV-2 COVID-19 AMP PRB: CPT

## 2023-02-13 PROCEDURE — 85730 THROMBOPLASTIN TIME PARTIAL: CPT

## 2023-02-13 PROCEDURE — 85027 COMPLETE CBC AUTOMATED: CPT

## 2023-02-13 PROCEDURE — 0225U NFCT DS DNA&RNA 21 SARSCOV2: CPT

## 2023-02-13 PROCEDURE — 85025 COMPLETE CBC W/AUTO DIFF WBC: CPT

## 2023-02-13 PROCEDURE — 82746 ASSAY OF FOLIC ACID SERUM: CPT

## 2023-02-13 PROCEDURE — 84443 ASSAY THYROID STIM HORMONE: CPT

## 2023-02-13 PROCEDURE — 71045 X-RAY EXAM CHEST 1 VIEW: CPT

## 2023-02-13 PROCEDURE — 84134 ASSAY OF PREALBUMIN: CPT

## 2023-02-13 RX ORDER — PROPRANOLOL HCL 160 MG
1 CAPSULE, EXTENDED RELEASE 24HR ORAL
Qty: 0 | Refills: 0 | DISCHARGE

## 2023-02-13 RX ORDER — LIDOCAINE 4 G/100G
1 CREAM TOPICAL
Qty: 0 | Refills: 0 | DISCHARGE
Start: 2023-02-13

## 2023-02-13 RX ORDER — LOSARTAN POTASSIUM 100 MG/1
1 TABLET, FILM COATED ORAL
Qty: 0 | Refills: 0 | DISCHARGE

## 2023-02-13 RX ORDER — TRAMADOL HYDROCHLORIDE 50 MG/1
1 TABLET ORAL
Qty: 0 | Refills: 0 | DISCHARGE

## 2023-02-13 RX ORDER — OXYBUTYNIN CHLORIDE 5 MG
1 TABLET ORAL
Qty: 0 | Refills: 0 | DISCHARGE

## 2023-02-13 RX ORDER — AMLODIPINE BESYLATE 2.5 MG/1
1 TABLET ORAL
Qty: 0 | Refills: 0 | DISCHARGE
Start: 2023-02-13

## 2023-02-13 RX ORDER — TRAMADOL HYDROCHLORIDE 50 MG/1
1 TABLET ORAL
Qty: 0 | Refills: 0 | DISCHARGE
Start: 2023-02-13

## 2023-02-13 RX ORDER — MEMANTINE HYDROCHLORIDE 10 MG/1
1 TABLET ORAL
Qty: 0 | Refills: 0 | DISCHARGE
Start: 2023-02-13

## 2023-02-13 RX ORDER — ATORVASTATIN CALCIUM 80 MG/1
1 TABLET, FILM COATED ORAL
Qty: 0 | Refills: 0 | DISCHARGE
Start: 2023-02-13

## 2023-02-13 RX ORDER — ATORVASTATIN CALCIUM 80 MG/1
1 TABLET, FILM COATED ORAL
Qty: 0 | Refills: 0 | DISCHARGE

## 2023-02-13 RX ORDER — CELECOXIB 200 MG/1
1 CAPSULE ORAL
Qty: 0 | Refills: 0 | DISCHARGE

## 2023-02-13 RX ORDER — OXYBUTYNIN CHLORIDE 5 MG
1 TABLET ORAL
Qty: 0 | Refills: 0 | DISCHARGE
Start: 2023-02-13

## 2023-02-13 RX ORDER — GABAPENTIN 400 MG/1
1 CAPSULE ORAL
Qty: 0 | Refills: 0 | DISCHARGE
Start: 2023-02-13

## 2023-02-13 RX ORDER — ACETAMINOPHEN 500 MG
2 TABLET ORAL
Qty: 0 | Refills: 0 | DISCHARGE
Start: 2023-02-13

## 2023-02-13 RX ORDER — AMLODIPINE BESYLATE 2.5 MG/1
1 TABLET ORAL
Qty: 0 | Refills: 0 | DISCHARGE

## 2023-02-13 RX ORDER — ASPIRIN/CALCIUM CARB/MAGNESIUM 324 MG
1 TABLET ORAL
Qty: 0 | Refills: 0 | DISCHARGE

## 2023-02-13 RX ORDER — ACETAMINOPHEN 500 MG
1 TABLET ORAL
Qty: 0 | Refills: 0 | DISCHARGE

## 2023-02-13 RX ORDER — CELECOXIB 200 MG/1
1 CAPSULE ORAL
Qty: 0 | Refills: 0 | DISCHARGE
Start: 2023-02-13

## 2023-02-13 RX ORDER — PROPRANOLOL HCL 160 MG
1 CAPSULE, EXTENDED RELEASE 24HR ORAL
Qty: 0 | Refills: 0 | DISCHARGE
Start: 2023-02-13

## 2023-02-13 RX ORDER — LOSARTAN POTASSIUM 100 MG/1
1 TABLET, FILM COATED ORAL
Qty: 0 | Refills: 0 | DISCHARGE
Start: 2023-02-13

## 2023-02-13 RX ORDER — ASPIRIN/CALCIUM CARB/MAGNESIUM 324 MG
1 TABLET ORAL
Qty: 0 | Refills: 0 | DISCHARGE
Start: 2023-02-13

## 2023-02-13 RX ADMIN — AMLODIPINE BESYLATE 5 MILLIGRAM(S): 2.5 TABLET ORAL at 05:49

## 2023-02-13 RX ADMIN — GABAPENTIN 100 MILLIGRAM(S): 400 CAPSULE ORAL at 05:49

## 2023-02-13 RX ADMIN — MEMANTINE HYDROCHLORIDE 5 MILLIGRAM(S): 10 TABLET ORAL at 05:50

## 2023-02-13 RX ADMIN — Medication 81 MILLIGRAM(S): at 11:33

## 2023-02-13 RX ADMIN — LIDOCAINE 1 PATCH: 4 CREAM TOPICAL at 11:33

## 2023-02-13 RX ADMIN — LOSARTAN POTASSIUM 100 MILLIGRAM(S): 100 TABLET, FILM COATED ORAL at 05:49

## 2023-02-13 RX ADMIN — CELECOXIB 200 MILLIGRAM(S): 200 CAPSULE ORAL at 11:28

## 2023-02-13 RX ADMIN — HEPARIN SODIUM 5000 UNIT(S): 5000 INJECTION INTRAVENOUS; SUBCUTANEOUS at 05:50

## 2023-02-13 RX ADMIN — Medication 1 TABLET(S): at 05:50

## 2023-02-13 RX ADMIN — HEPARIN SODIUM 5000 UNIT(S): 5000 INJECTION INTRAVENOUS; SUBCUTANEOUS at 13:20

## 2023-02-13 RX ADMIN — Medication 5 MILLIGRAM(S): at 11:28

## 2023-02-13 RX ADMIN — Medication 250 MILLIGRAM(S): at 05:50

## 2023-02-13 RX ADMIN — LIDOCAINE 1 PATCH: 4 CREAM TOPICAL at 01:05

## 2023-02-13 NOTE — DISCHARGE NOTE NURSING/CASE MANAGEMENT/SOCIAL WORK - NSDCPEFALRISK_GEN_ALL_CORE
For information on Fall & Injury Prevention, visit: [email or web address] OR  [email or web address] OR  https://www.cdc.gov/steadi/patient.html

## 2023-02-13 NOTE — PROGRESS NOTE ADULT - ASSESSMENT
88 yo female with pmh dementia, HTN, on [id number] daily here with increased generalized weakness and decreased PO since yesterday. Pts son at bedside providing history and translation. States pt usually ambulatory with walker at baseline and over the past 48 hours has been having worsening weakness. Per son, pt has been complaining of some mild R hip pain but denies trauma or injury. No fevers, chills, nausea, vomiting, abdominal pain, shortness of breath, urinary complaints, no cough.    poss UTI  - pt is asymptomatic  - urine culture is positive  - s/p  ceftin x 3 days    FTT  - nutrition consult  - PT eval    Dementia  - b12 , folate, tsh normal    HTN  - c/w home meds    dvt px   sq heparin    dispo  - d/c planning [facility]  - await insurance auth
88 yo female with pmh dementia, HTN, on [id number] daily here with increased generalized weakness and decreased PO since yesterday. Pts son at bedside providing history and translation. States pt usually ambulatory with walker at baseline and over the past 48 hours has been having worsening weakness. Per son, pt has been complaining of some mild R hip pain but denies trauma or injury. No fevers, chills, nausea, vomiting, abdominal pain, shortness of breath, urinary complaints, no cough.    FTT  - nutrition consult  - UA is normal  - PT eval    Dementia  - b12 , folate, tsh normal    HTN  - c/w home meds    dvt px   sq heparin    dispo  - d/c planning [facility]
88 yo female with pmh dementia, HTN, on [id number] daily here with increased generalized weakness and decreased PO since yesterday. Pts son at bedside providing history and translation. States pt usually ambulatory with walker at baseline and over the past 48 hours has been having worsening weakness. Per son, pt has been complaining of some mild R hip pain but denies trauma or injury. No fevers, chills, nausea, vomiting, abdominal pain, shortness of breath, urinary complaints, no cough.    poss UTI  - pt is asymptomatic  - urine culture is positive  - will treat with ceftin x 3 days    FTT  - nutrition consult  - PT eval    Dementia  - b12 , folate, tsh normal    HTN  - c/w home meds    dvt px   sq heparin    dispo  - d/c planning [facility]  - await insurance auth
89 yo female with pmh dementia, HTN, on [id number] daily here with increased generalized weakness and decreased PO since yesterday. Pts son at bedside providing history and translation. States pt usually ambulatory with walker at baseline and over the past 48 hours has been having worsening weakness. Per son, pt has been complaining of some mild R hip pain but denies trauma or injury. No fevers, chills, nausea, vomiting, abdominal pain, shortness of breath, urinary complaints, no cough.    poss UTI  - pt is asymptomatic  - urine culture is positive  - will treat with ceftin x 3 days    FTT  - nutrition consult  - PT eval    Dementia  - b12 , folate, tsh normal    HTN  - c/w home meds    dvt px   sq heparin    dispo  - d/c planning [facility]
88 yo female with pmh dementia, HTN, on [id number] daily here with increased generalized weakness and decreased PO since yesterday. Pts son at bedside providing history and translation. States pt usually ambulatory with walker at baseline and over the past 48 hours has been having worsening weakness. Per son, pt has been complaining of some mild R hip pain but denies trauma or injury. No fevers, chills, nausea, vomiting, abdominal pain, shortness of breath, urinary complaints, no cough.    poss UTI  - pt is asymptomatic  - urine culture is positive  - will treat with ceftin x 3 days    FTT  - nutrition consult  - PT eval    Dementia  - b12 , folate, tsh normal    HTN  - c/w home meds    dvt px   sq heparin    dispo  - d/c planning [facility]  - await insurance auth

## 2023-02-13 NOTE — DISCHARGE NOTE NURSING/CASE MANAGEMENT/SOCIAL WORK - NSDPFAC_GEN_ALL_CORE
ANNIE Rutledge [facility] Phone: [phone]

## 2023-02-13 NOTE — PROGRESS NOTE ADULT - SUBJECTIVE AND OBJECTIVE BOX
DATE OF SERVICE: 02-13-23 @ 10:16    Patient is a 89y old  Female who presents with a chief complaint of FTT (12 Feb 2023 13:15)      SUBJECTIVE / OVERNIGHT EVENTS:  No chest pain. No shortness of breath. No complaints. No events overnight.     MEDICATIONS  (STANDING):  amLODIPine   Tablet 5 milliGRAM(s) Oral daily  aspirin enteric coated 81 milliGRAM(s) Oral daily  atorvastatin 40 milliGRAM(s) Oral at bedtime  calcium carbonate 1250 mG  + Vitamin D (OsCal 500 + D) 1 Tablet(s) Oral two times a day  celecoxib 200 milliGRAM(s) Oral daily  gabapentin 100 milliGRAM(s) Oral two times a day  heparin   Injectable 5000 Unit(s) SubCutaneous every 8 hours  lidocaine   4% Patch 1 Patch Transdermal daily  losartan 100 milliGRAM(s) Oral daily  memantine 5 milliGRAM(s) Oral two times a day  oxybutynin 5 milliGRAM(s) Oral daily  propranolol 20 milliGRAM(s) Oral three times a day    MEDICATIONS  (PRN):  acetaminophen     Tablet .. 650 milliGRAM(s) Oral every 6 hours PRN Temp greater or equal to 38C (100.4F), Mild Pain (1 - 3)  traMADol 50 milliGRAM(s) Oral every 6 hours PRN Severe Pain (7 - 10)      Vital Signs Last 24 Hrs  T(C): 36.1 (13 Feb 2023 06:21), Max: 36.8 (12 Feb 2023 19:55)  T(F): 97 (13 Feb 2023 06:21), Max: 98.2 (12 Feb 2023 19:55)  HR: 56 (13 Feb 2023 06:21) (52 - 56)  BP: 156/84 (13 Feb 2023 06:21) (96/59 - 156/84)  BP(mean): --  RR: 18 (13 Feb 2023 06:21) (18 - 18)  SpO2: 97% (13 Feb 2023 06:21) (95% - 97%)    Parameters below as of 13 Feb 2023 06:21  Patient On (Oxygen Delivery Method): room air      CAPILLARY BLOOD GLUCOSE        I&O's Summary      PHYSICAL EXAM:  GENERAL: NAD, well-developed  HEAD:  Atraumatic, Normocephalic  EYES: EOMI, PERRLA, conjunctiva and sclera clear  NECK: Supple, No JVD  CHEST/LUNG: Clear to auscultation bilaterally; No wheeze  HEART: Regular rate and rhythm; No murmurs, rubs, or gallops  ABDOMEN: Soft, Nontender, Nondistended; Bowel sounds present  EXTREMITIES:  2+ Peripheral Pulses, No clubbing, cyanosis, or edema  NEUROLOGY: non-focal  SKIN: No rashes or lesions    LABS:                    RADIOLOGY & ADDITIONAL TESTS:    Imaging Personally Reviewed:    Consultant(s) Notes Reviewed:      Care Discussed with Consultants/Other Providers:

## 2023-02-13 NOTE — DISCHARGE NOTE NURSING/CASE MANAGEMENT/SOCIAL WORK - PATIENT PORTAL LINK FT
You can access the FollowMyHealth Patient Portal offered by [facility] by registering at the following website: [email or web address]. By joining [facility]’s FollowMyHealth portal, you will also be able to view your health information using other applications (apps) compatible with our system.

## 2023-09-07 NOTE — DISCHARGE NOTE PROVIDER - NSDCHHPTASSISTHOME_GEN_ALL_CORE
[facility]: Cancer Care                                                                                          Called Pat, per the request of Reggie Osorio NP to follow up on her dexa scan results, Shared that her updated DEXA continues to just show osteopenia with some improvement and some decline.  Instructed  to continue daily 9944-0990 mg calcium + [id number] international unit(s) vitamin D supplements. She is taking 600 calcium/200 international unit(s) D daily and instructd to increase this dose to twce daily so that she will get the adequate amount of calcium. Instructed to look at her bottle of Vit D as she will only need additional 1000 international unit(s) per day as she has 400 mg in her total dose of calcium/vit D. She stated understanding.  She also has concerns about her neuropathy. She understands that her neuropathy will likely not improve at this time.She is trying to stay as active as possible but wonders if she could benefit from PT to build strength and endurance. Will discuss with Reggie and call her later today with update on Reggie's input on PT order  [id number] Called Kylah to relay that writer spoke with Reggie and he would be happy to enter an order for PT for strength and endurance as she has lost strength with having neuropathy as she has not been as active. She would like to learn new exercises to help improve her activity. She is aware the order will be placed and it may take a few days for the schedulers to reach out to her to schedule her apts. She expressed appreciation and writer will monitor to make sure she gets scheduled.    Signature:  Craina Brooke RN   
Patient Needs Assistance to Leave Residence...

## 2024-04-27 NOTE — PHYSICAL THERAPY INITIAL EVALUATION ADULT - WEIGHT-BEARING RESTRICTIONS: SIT/STAND, REHAB EVAL
full weight-bearing
h/o DM   takes metformin 1000 BID at home  ISS achs for now  glucose 138 on admission  monitor FS

## 2024-06-13 NOTE — ED ADULT TRIAGE NOTE - HEART RATE (BEATS/MIN)
Nephrology History  iHD Schedule:    Unit/MD: [facility]/Dr. Hare   Duration: 4 hours   EDW: 100 kg.   Access: JENNY AVF (recently ok to use by vascular). Currently using TDC   Residual Renal Function: +++    Assessment:     - Continue  iHD schedule   - Plan for next HD 6/14   - continue lasix   - Hyponatremia in setting of ESRD - 1L fluid restriction   - resume home phos binders      84

## 2025-03-24 NOTE — CONSULT NOTE ADULT - REASON FOR ADMISSION
Pt reports she takes suboxone 8mg morning and night; has not had her night time dose and reports increased anxiety.   
functional paraplegia
functional paraplegia